# Patient Record
Sex: MALE | Race: WHITE | NOT HISPANIC OR LATINO | URBAN - METROPOLITAN AREA
[De-identification: names, ages, dates, MRNs, and addresses within clinical notes are randomized per-mention and may not be internally consistent; named-entity substitution may affect disease eponyms.]

---

## 2017-06-29 ENCOUNTER — OUTPATIENT (OUTPATIENT)
Dept: OUTPATIENT SERVICES | Facility: HOSPITAL | Age: 22
LOS: 1 days | Discharge: HOME | End: 2017-06-29

## 2017-06-29 DIAGNOSIS — F20.9 SCHIZOPHRENIA, UNSPECIFIED: ICD-10-CM

## 2017-06-29 DIAGNOSIS — F20.0 PARANOID SCHIZOPHRENIA: ICD-10-CM

## 2017-07-27 ENCOUNTER — OUTPATIENT (OUTPATIENT)
Dept: OUTPATIENT SERVICES | Facility: HOSPITAL | Age: 22
LOS: 1 days | Discharge: HOME | End: 2017-07-27

## 2017-07-27 DIAGNOSIS — F20.9 SCHIZOPHRENIA, UNSPECIFIED: ICD-10-CM

## 2017-07-27 DIAGNOSIS — F20.0 PARANOID SCHIZOPHRENIA: ICD-10-CM

## 2017-08-24 ENCOUNTER — OUTPATIENT (OUTPATIENT)
Dept: OUTPATIENT SERVICES | Facility: HOSPITAL | Age: 22
LOS: 1 days | Discharge: HOME | End: 2017-08-24

## 2017-08-24 DIAGNOSIS — F20.0 PARANOID SCHIZOPHRENIA: ICD-10-CM

## 2017-08-24 DIAGNOSIS — F20.9 SCHIZOPHRENIA, UNSPECIFIED: ICD-10-CM

## 2017-11-14 ENCOUNTER — OUTPATIENT (OUTPATIENT)
Dept: OUTPATIENT SERVICES | Facility: HOSPITAL | Age: 22
LOS: 1 days | Discharge: HOME | End: 2017-11-14

## 2017-11-14 ENCOUNTER — INPATIENT (INPATIENT)
Facility: HOSPITAL | Age: 22
LOS: 16 days | Discharge: HOME | End: 2017-12-01
Attending: PSYCHIATRY & NEUROLOGY

## 2017-11-14 DIAGNOSIS — F20.9 SCHIZOPHRENIA, UNSPECIFIED: ICD-10-CM

## 2017-11-14 DIAGNOSIS — F20.0 PARANOID SCHIZOPHRENIA: ICD-10-CM

## 2017-12-06 DIAGNOSIS — F20.0 PARANOID SCHIZOPHRENIA: ICD-10-CM

## 2017-12-06 DIAGNOSIS — F10.10 ALCOHOL ABUSE, UNCOMPLICATED: ICD-10-CM

## 2017-12-06 DIAGNOSIS — Z91.19 PATIENT'S NONCOMPLIANCE WITH OTHER MEDICAL TREATMENT AND REGIMEN: ICD-10-CM

## 2017-12-06 DIAGNOSIS — F19.10 OTHER PSYCHOACTIVE SUBSTANCE ABUSE, UNCOMPLICATED: ICD-10-CM

## 2017-12-29 ENCOUNTER — OUTPATIENT (OUTPATIENT)
Dept: OUTPATIENT SERVICES | Facility: HOSPITAL | Age: 22
LOS: 1 days | Discharge: HOME | End: 2017-12-29

## 2017-12-29 DIAGNOSIS — F20.9 SCHIZOPHRENIA, UNSPECIFIED: ICD-10-CM

## 2018-01-18 ENCOUNTER — OUTPATIENT (OUTPATIENT)
Dept: OUTPATIENT SERVICES | Facility: HOSPITAL | Age: 23
LOS: 1 days | Discharge: HOME | End: 2018-01-18

## 2018-01-18 DIAGNOSIS — F20.9 SCHIZOPHRENIA, UNSPECIFIED: ICD-10-CM

## 2018-01-30 DIAGNOSIS — F20.0 PARANOID SCHIZOPHRENIA: ICD-10-CM

## 2018-02-15 ENCOUNTER — OUTPATIENT (OUTPATIENT)
Dept: OUTPATIENT SERVICES | Facility: HOSPITAL | Age: 23
LOS: 1 days | Discharge: HOME | End: 2018-02-15

## 2018-02-15 DIAGNOSIS — F20.9 SCHIZOPHRENIA, UNSPECIFIED: ICD-10-CM

## 2018-03-14 ENCOUNTER — OUTPATIENT (OUTPATIENT)
Dept: OUTPATIENT SERVICES | Facility: HOSPITAL | Age: 23
LOS: 1 days | Discharge: HOME | End: 2018-03-14

## 2018-03-14 DIAGNOSIS — F20.9 SCHIZOPHRENIA, UNSPECIFIED: ICD-10-CM

## 2018-04-18 ENCOUNTER — OUTPATIENT (OUTPATIENT)
Dept: OUTPATIENT SERVICES | Facility: HOSPITAL | Age: 23
LOS: 1 days | Discharge: HOME | End: 2018-04-18

## 2018-04-18 DIAGNOSIS — F20.9 SCHIZOPHRENIA, UNSPECIFIED: ICD-10-CM

## 2018-05-14 ENCOUNTER — OUTPATIENT (OUTPATIENT)
Dept: OUTPATIENT SERVICES | Facility: HOSPITAL | Age: 23
LOS: 1 days | Discharge: HOME | End: 2018-05-14

## 2018-05-14 DIAGNOSIS — F20.9 SCHIZOPHRENIA, UNSPECIFIED: ICD-10-CM

## 2018-12-11 ENCOUNTER — OUTPATIENT (OUTPATIENT)
Dept: OUTPATIENT SERVICES | Facility: HOSPITAL | Age: 23
LOS: 1 days | Discharge: HOME | End: 2018-12-11

## 2018-12-11 DIAGNOSIS — F20.9 SCHIZOPHRENIA, UNSPECIFIED: ICD-10-CM

## 2018-12-26 ENCOUNTER — OUTPATIENT (OUTPATIENT)
Dept: OUTPATIENT SERVICES | Facility: HOSPITAL | Age: 23
LOS: 1 days | Discharge: HOME | End: 2018-12-26

## 2018-12-26 DIAGNOSIS — F20.9 SCHIZOPHRENIA, UNSPECIFIED: ICD-10-CM

## 2019-01-11 DIAGNOSIS — F20.0 PARANOID SCHIZOPHRENIA: ICD-10-CM

## 2019-01-23 ENCOUNTER — OUTPATIENT (OUTPATIENT)
Dept: OUTPATIENT SERVICES | Facility: HOSPITAL | Age: 24
LOS: 1 days | Discharge: HOME | End: 2019-01-23

## 2019-01-23 DIAGNOSIS — F20.9 SCHIZOPHRENIA, UNSPECIFIED: ICD-10-CM

## 2019-02-20 ENCOUNTER — OUTPATIENT (OUTPATIENT)
Dept: OUTPATIENT SERVICES | Facility: HOSPITAL | Age: 24
LOS: 1 days | Discharge: HOME | End: 2019-02-20

## 2019-02-20 DIAGNOSIS — F20.9 SCHIZOPHRENIA, UNSPECIFIED: ICD-10-CM

## 2019-03-20 ENCOUNTER — OUTPATIENT (OUTPATIENT)
Dept: OUTPATIENT SERVICES | Facility: HOSPITAL | Age: 24
LOS: 1 days | Discharge: HOME | End: 2019-03-20

## 2019-03-20 DIAGNOSIS — F20.9 SCHIZOPHRENIA, UNSPECIFIED: ICD-10-CM

## 2019-04-17 ENCOUNTER — OUTPATIENT (OUTPATIENT)
Dept: OUTPATIENT SERVICES | Facility: HOSPITAL | Age: 24
LOS: 1 days | Discharge: HOME | End: 2019-04-17

## 2019-04-17 DIAGNOSIS — F20.9 SCHIZOPHRENIA, UNSPECIFIED: ICD-10-CM

## 2019-05-15 ENCOUNTER — OUTPATIENT (OUTPATIENT)
Dept: OUTPATIENT SERVICES | Facility: HOSPITAL | Age: 24
LOS: 1 days | Discharge: HOME | End: 2019-05-15
Payer: MEDICARE

## 2019-05-15 DIAGNOSIS — F20.9 SCHIZOPHRENIA, UNSPECIFIED: ICD-10-CM

## 2019-05-15 PROCEDURE — 99213 OFFICE O/P EST LOW 20 MIN: CPT

## 2019-06-12 ENCOUNTER — OUTPATIENT (OUTPATIENT)
Dept: OUTPATIENT SERVICES | Facility: HOSPITAL | Age: 24
LOS: 1 days | Discharge: HOME | End: 2019-06-12
Payer: MEDICARE

## 2019-06-12 DIAGNOSIS — F20.9 SCHIZOPHRENIA, UNSPECIFIED: ICD-10-CM

## 2019-06-12 PROCEDURE — 99213 OFFICE O/P EST LOW 20 MIN: CPT

## 2019-07-10 ENCOUNTER — OUTPATIENT (OUTPATIENT)
Dept: OUTPATIENT SERVICES | Facility: HOSPITAL | Age: 24
LOS: 1 days | Discharge: HOME | End: 2019-07-10
Payer: MEDICARE

## 2019-07-10 DIAGNOSIS — F20.9 SCHIZOPHRENIA, UNSPECIFIED: ICD-10-CM

## 2019-07-10 PROCEDURE — 99213 OFFICE O/P EST LOW 20 MIN: CPT

## 2019-08-07 ENCOUNTER — OUTPATIENT (OUTPATIENT)
Dept: OUTPATIENT SERVICES | Facility: HOSPITAL | Age: 24
LOS: 1 days | Discharge: HOME | End: 2019-08-07
Payer: MEDICARE

## 2019-08-07 DIAGNOSIS — F20.9 SCHIZOPHRENIA, UNSPECIFIED: ICD-10-CM

## 2019-08-07 PROCEDURE — 99213 OFFICE O/P EST LOW 20 MIN: CPT

## 2019-09-04 ENCOUNTER — OUTPATIENT (OUTPATIENT)
Dept: OUTPATIENT SERVICES | Facility: HOSPITAL | Age: 24
LOS: 1 days | Discharge: HOME | End: 2019-09-04
Payer: MEDICARE

## 2019-09-04 DIAGNOSIS — F20.9 SCHIZOPHRENIA, UNSPECIFIED: ICD-10-CM

## 2019-09-04 PROCEDURE — 99213 OFFICE O/P EST LOW 20 MIN: CPT

## 2019-10-02 ENCOUNTER — OUTPATIENT (OUTPATIENT)
Dept: OUTPATIENT SERVICES | Facility: HOSPITAL | Age: 24
LOS: 1 days | Discharge: HOME | End: 2019-10-02
Payer: MEDICARE

## 2019-10-02 DIAGNOSIS — F20.9 SCHIZOPHRENIA, UNSPECIFIED: ICD-10-CM

## 2019-10-02 PROCEDURE — 99213 OFFICE O/P EST LOW 20 MIN: CPT

## 2019-10-30 ENCOUNTER — OUTPATIENT (OUTPATIENT)
Dept: OUTPATIENT SERVICES | Facility: HOSPITAL | Age: 24
LOS: 1 days | Discharge: HOME | End: 2019-10-30

## 2019-10-30 DIAGNOSIS — F20.9 SCHIZOPHRENIA, UNSPECIFIED: ICD-10-CM

## 2019-11-25 ENCOUNTER — OUTPATIENT (OUTPATIENT)
Dept: OUTPATIENT SERVICES | Facility: HOSPITAL | Age: 24
LOS: 1 days | Discharge: HOME | End: 2019-11-25
Payer: MEDICARE

## 2019-11-25 DIAGNOSIS — F20.9 SCHIZOPHRENIA, UNSPECIFIED: ICD-10-CM

## 2019-11-25 PROCEDURE — 99214 OFFICE O/P EST MOD 30 MIN: CPT

## 2019-12-23 ENCOUNTER — OUTPATIENT (OUTPATIENT)
Dept: OUTPATIENT SERVICES | Facility: HOSPITAL | Age: 24
LOS: 1 days | Discharge: HOME | End: 2019-12-23
Payer: MEDICARE

## 2019-12-23 DIAGNOSIS — F20.9 SCHIZOPHRENIA, UNSPECIFIED: ICD-10-CM

## 2019-12-23 PROCEDURE — 99213 OFFICE O/P EST LOW 20 MIN: CPT

## 2020-01-22 ENCOUNTER — OUTPATIENT (OUTPATIENT)
Dept: OUTPATIENT SERVICES | Facility: HOSPITAL | Age: 25
LOS: 1 days | Discharge: HOME | End: 2020-01-22
Payer: MEDICARE

## 2020-01-22 DIAGNOSIS — F20.9 SCHIZOPHRENIA, UNSPECIFIED: ICD-10-CM

## 2020-01-22 PROCEDURE — 99213 OFFICE O/P EST LOW 20 MIN: CPT

## 2020-02-19 ENCOUNTER — OUTPATIENT (OUTPATIENT)
Dept: OUTPATIENT SERVICES | Facility: HOSPITAL | Age: 25
LOS: 1 days | Discharge: HOME | End: 2020-02-19

## 2020-02-19 DIAGNOSIS — F20.9 SCHIZOPHRENIA, UNSPECIFIED: ICD-10-CM

## 2020-03-18 ENCOUNTER — OUTPATIENT (OUTPATIENT)
Dept: OUTPATIENT SERVICES | Facility: HOSPITAL | Age: 25
LOS: 1 days | Discharge: HOME | End: 2020-03-18
Payer: MEDICARE

## 2020-03-18 DIAGNOSIS — F20.9 SCHIZOPHRENIA, UNSPECIFIED: ICD-10-CM

## 2020-03-18 PROCEDURE — 99213 OFFICE O/P EST LOW 20 MIN: CPT

## 2020-04-15 ENCOUNTER — OUTPATIENT (OUTPATIENT)
Dept: OUTPATIENT SERVICES | Facility: HOSPITAL | Age: 25
LOS: 1 days | Discharge: HOME | End: 2020-04-15

## 2020-04-15 DIAGNOSIS — F20.9 SCHIZOPHRENIA, UNSPECIFIED: ICD-10-CM

## 2020-05-13 ENCOUNTER — OUTPATIENT (OUTPATIENT)
Dept: OUTPATIENT SERVICES | Facility: HOSPITAL | Age: 25
LOS: 1 days | Discharge: HOME | End: 2020-05-13

## 2020-05-13 DIAGNOSIS — F20.9 SCHIZOPHRENIA, UNSPECIFIED: ICD-10-CM

## 2020-06-10 ENCOUNTER — OUTPATIENT (OUTPATIENT)
Dept: OUTPATIENT SERVICES | Facility: HOSPITAL | Age: 25
LOS: 1 days | Discharge: HOME | End: 2020-06-10
Payer: MEDICARE

## 2020-06-10 DIAGNOSIS — F20.9 SCHIZOPHRENIA, UNSPECIFIED: ICD-10-CM

## 2020-06-10 PROCEDURE — 99213 OFFICE O/P EST LOW 20 MIN: CPT

## 2020-07-08 ENCOUNTER — OUTPATIENT (OUTPATIENT)
Dept: OUTPATIENT SERVICES | Facility: HOSPITAL | Age: 25
LOS: 1 days | Discharge: HOME | End: 2020-07-08

## 2020-07-08 DIAGNOSIS — F20.9 SCHIZOPHRENIA, UNSPECIFIED: ICD-10-CM

## 2020-08-12 ENCOUNTER — OUTPATIENT (OUTPATIENT)
Dept: OUTPATIENT SERVICES | Facility: HOSPITAL | Age: 25
LOS: 1 days | Discharge: HOME | End: 2020-08-12

## 2020-08-12 DIAGNOSIS — F20.9 SCHIZOPHRENIA, UNSPECIFIED: ICD-10-CM

## 2020-09-02 ENCOUNTER — OUTPATIENT (OUTPATIENT)
Dept: OUTPATIENT SERVICES | Facility: HOSPITAL | Age: 25
LOS: 1 days | Discharge: HOME | End: 2020-09-02
Payer: MEDICARE

## 2020-09-02 DIAGNOSIS — F20.9 SCHIZOPHRENIA, UNSPECIFIED: ICD-10-CM

## 2020-09-02 PROCEDURE — 99213 OFFICE O/P EST LOW 20 MIN: CPT

## 2020-10-28 ENCOUNTER — APPOINTMENT (OUTPATIENT)
Dept: PSYCHIATRY | Facility: CLINIC | Age: 25
End: 2020-10-28

## 2020-11-11 ENCOUNTER — OUTPATIENT (OUTPATIENT)
Dept: OUTPATIENT SERVICES | Facility: HOSPITAL | Age: 25
LOS: 1 days | Discharge: HOME | End: 2020-11-11

## 2020-11-11 DIAGNOSIS — F20.9 SCHIZOPHRENIA, UNSPECIFIED: ICD-10-CM

## 2020-11-23 ENCOUNTER — APPOINTMENT (OUTPATIENT)
Dept: PSYCHIATRY | Facility: CLINIC | Age: 25
End: 2020-11-23
Payer: MEDICARE

## 2020-11-23 ENCOUNTER — APPOINTMENT (OUTPATIENT)
Dept: PSYCHIATRY | Facility: CLINIC | Age: 25
End: 2020-11-23

## 2020-11-23 ENCOUNTER — OUTPATIENT (OUTPATIENT)
Dept: OUTPATIENT SERVICES | Facility: HOSPITAL | Age: 25
LOS: 1 days | Discharge: HOME | End: 2020-11-23

## 2020-11-23 DIAGNOSIS — F20.9 SCHIZOPHRENIA, UNSPECIFIED: ICD-10-CM

## 2020-11-23 PROCEDURE — 99213 OFFICE O/P EST LOW 20 MIN: CPT

## 2020-12-18 ENCOUNTER — NON-APPOINTMENT (OUTPATIENT)
Age: 25
End: 2020-12-18

## 2020-12-18 DIAGNOSIS — F10.10 ALCOHOL ABUSE, UNCOMPLICATED: ICD-10-CM

## 2020-12-23 ENCOUNTER — APPOINTMENT (OUTPATIENT)
Dept: PSYCHIATRY | Facility: CLINIC | Age: 25
End: 2020-12-23

## 2020-12-31 ENCOUNTER — OUTPATIENT (OUTPATIENT)
Dept: OUTPATIENT SERVICES | Facility: HOSPITAL | Age: 25
LOS: 1 days | Discharge: HOME | End: 2020-12-31

## 2020-12-31 ENCOUNTER — APPOINTMENT (OUTPATIENT)
Dept: PSYCHIATRY | Facility: CLINIC | Age: 25
End: 2020-12-31

## 2020-12-31 DIAGNOSIS — F20.9 SCHIZOPHRENIA, UNSPECIFIED: ICD-10-CM

## 2021-01-28 ENCOUNTER — APPOINTMENT (OUTPATIENT)
Dept: PSYCHIATRY | Facility: CLINIC | Age: 26
End: 2021-01-28

## 2021-02-11 ENCOUNTER — OUTPATIENT (OUTPATIENT)
Dept: OUTPATIENT SERVICES | Facility: HOSPITAL | Age: 26
LOS: 1 days | Discharge: HOME | End: 2021-02-11

## 2021-02-11 ENCOUNTER — APPOINTMENT (OUTPATIENT)
Dept: PSYCHIATRY | Facility: CLINIC | Age: 26
End: 2021-02-11

## 2021-02-11 DIAGNOSIS — F20.9 SCHIZOPHRENIA, UNSPECIFIED: ICD-10-CM

## 2021-02-16 ENCOUNTER — NON-APPOINTMENT (OUTPATIENT)
Age: 26
End: 2021-02-16

## 2021-02-17 ENCOUNTER — APPOINTMENT (OUTPATIENT)
Dept: PSYCHIATRY | Facility: CLINIC | Age: 26
End: 2021-02-17

## 2021-02-17 ENCOUNTER — APPOINTMENT (OUTPATIENT)
Dept: PSYCHIATRY | Facility: CLINIC | Age: 26
End: 2021-02-17
Payer: MEDICARE

## 2021-02-17 ENCOUNTER — OUTPATIENT (OUTPATIENT)
Dept: OUTPATIENT SERVICES | Facility: HOSPITAL | Age: 26
LOS: 1 days | Discharge: HOME | End: 2021-02-17

## 2021-02-17 VITALS
DIASTOLIC BLOOD PRESSURE: 71 MMHG | SYSTOLIC BLOOD PRESSURE: 113 MMHG | HEART RATE: 78 BPM | BODY MASS INDEX: 32.33 KG/M2 | HEIGHT: 67 IN | WEIGHT: 206 LBS | RESPIRATION RATE: 16 BRPM

## 2021-02-17 DIAGNOSIS — F20.9 SCHIZOPHRENIA, UNSPECIFIED: ICD-10-CM

## 2021-02-17 PROCEDURE — 99213 OFFICE O/P EST LOW 20 MIN: CPT | Mod: 95

## 2021-02-17 RX ORDER — PALIPERIDONE PALMITATE 273 MG/.875ML
273 INJECTION, SUSPENSION, EXTENDED RELEASE INTRAMUSCULAR
Qty: 0 | Refills: 0 | Status: COMPLETED | OUTPATIENT
Start: 2021-02-16

## 2021-03-11 ENCOUNTER — OUTPATIENT (OUTPATIENT)
Dept: OUTPATIENT SERVICES | Facility: HOSPITAL | Age: 26
LOS: 1 days | Discharge: HOME | End: 2021-03-11

## 2021-03-11 ENCOUNTER — APPOINTMENT (OUTPATIENT)
Dept: PSYCHIATRY | Facility: CLINIC | Age: 26
End: 2021-03-11

## 2021-03-11 DIAGNOSIS — F20.9 SCHIZOPHRENIA, UNSPECIFIED: ICD-10-CM

## 2021-04-07 ENCOUNTER — APPOINTMENT (OUTPATIENT)
Dept: PSYCHIATRY | Facility: CLINIC | Age: 26
End: 2021-04-07

## 2021-04-07 ENCOUNTER — OUTPATIENT (OUTPATIENT)
Dept: OUTPATIENT SERVICES | Facility: HOSPITAL | Age: 26
LOS: 1 days | Discharge: HOME | End: 2021-04-07

## 2021-05-05 ENCOUNTER — APPOINTMENT (OUTPATIENT)
Dept: PSYCHIATRY | Facility: CLINIC | Age: 26
End: 2021-05-05

## 2021-05-10 ENCOUNTER — APPOINTMENT (OUTPATIENT)
Dept: PSYCHIATRY | Facility: CLINIC | Age: 26
End: 2021-05-10
Payer: MEDICARE

## 2021-05-10 ENCOUNTER — OUTPATIENT (OUTPATIENT)
Dept: OUTPATIENT SERVICES | Facility: HOSPITAL | Age: 26
LOS: 1 days | Discharge: HOME | End: 2021-05-10

## 2021-05-10 DIAGNOSIS — F20.9 SCHIZOPHRENIA, UNSPECIFIED: ICD-10-CM

## 2021-05-10 PROCEDURE — 99213 OFFICE O/P EST LOW 20 MIN: CPT | Mod: 95

## 2021-05-12 ENCOUNTER — APPOINTMENT (OUTPATIENT)
Dept: PSYCHIATRY | Facility: CLINIC | Age: 26
End: 2021-05-12

## 2021-05-18 RX ADMIN — PALIPERIDONE PALMITATE 0 MG/0.875ML: 273 INJECTION, SUSPENSION, EXTENDED RELEASE INTRAMUSCULAR at 00:00

## 2021-05-19 ENCOUNTER — APPOINTMENT (OUTPATIENT)
Dept: PSYCHIATRY | Facility: CLINIC | Age: 26
End: 2021-05-19

## 2021-05-19 ENCOUNTER — OUTPATIENT (OUTPATIENT)
Dept: OUTPATIENT SERVICES | Facility: HOSPITAL | Age: 26
LOS: 1 days | Discharge: HOME | End: 2021-05-19

## 2021-05-19 VITALS — HEART RATE: 84 BPM | WEIGHT: 206 LBS | BODY MASS INDEX: 32.33 KG/M2 | HEIGHT: 67 IN | RESPIRATION RATE: 16 BRPM

## 2021-05-19 DIAGNOSIS — F20.9 SCHIZOPHRENIA, UNSPECIFIED: ICD-10-CM

## 2021-05-19 RX ORDER — PALIPERIDONE PALMITATE 273 MG/.875ML
273 INJECTION, SUSPENSION, EXTENDED RELEASE INTRAMUSCULAR
Qty: 0 | Refills: 0 | Status: COMPLETED | OUTPATIENT
Start: 2021-05-18

## 2021-07-27 ENCOUNTER — NON-APPOINTMENT (OUTPATIENT)
Age: 26
End: 2021-07-27

## 2021-08-02 ENCOUNTER — NON-APPOINTMENT (OUTPATIENT)
Age: 26
End: 2021-08-02

## 2021-08-04 ENCOUNTER — APPOINTMENT (OUTPATIENT)
Dept: PSYCHIATRY | Facility: CLINIC | Age: 26
End: 2021-08-04

## 2021-08-10 ENCOUNTER — NON-APPOINTMENT (OUTPATIENT)
Age: 26
End: 2021-08-10

## 2021-08-11 ENCOUNTER — OUTPATIENT (OUTPATIENT)
Dept: OUTPATIENT SERVICES | Facility: HOSPITAL | Age: 26
LOS: 1 days | Discharge: HOME | End: 2021-08-11

## 2021-08-11 ENCOUNTER — APPOINTMENT (OUTPATIENT)
Dept: PSYCHIATRY | Facility: CLINIC | Age: 26
End: 2021-08-11
Payer: MEDICARE

## 2021-08-11 VITALS
HEIGHT: 67 IN | RESPIRATION RATE: 16 BRPM | BODY MASS INDEX: 32.49 KG/M2 | SYSTOLIC BLOOD PRESSURE: 108 MMHG | DIASTOLIC BLOOD PRESSURE: 70 MMHG | WEIGHT: 207 LBS | HEART RATE: 64 BPM

## 2021-08-11 DIAGNOSIS — F20.9 SCHIZOPHRENIA, UNSPECIFIED: ICD-10-CM

## 2021-08-11 PROCEDURE — 99213 OFFICE O/P EST LOW 20 MIN: CPT | Mod: 95

## 2021-08-11 RX ORDER — PALIPERIDONE PALMITATE 273 MG/.875ML
273 INJECTION, SUSPENSION, EXTENDED RELEASE INTRAMUSCULAR
Qty: 0 | Refills: 0 | Status: COMPLETED | OUTPATIENT
Start: 2021-08-11

## 2021-08-11 RX ADMIN — PALIPERIDONE PALMITATE 0 MG/0.875ML: 273 INJECTION, SUSPENSION, EXTENDED RELEASE INTRAMUSCULAR at 00:00

## 2021-08-23 ENCOUNTER — NON-APPOINTMENT (OUTPATIENT)
Age: 26
End: 2021-08-23

## 2021-08-31 ENCOUNTER — NON-APPOINTMENT (OUTPATIENT)
Age: 26
End: 2021-08-31

## 2021-09-10 ENCOUNTER — NON-APPOINTMENT (OUTPATIENT)
Age: 26
End: 2021-09-10

## 2021-09-23 ENCOUNTER — NON-APPOINTMENT (OUTPATIENT)
Age: 26
End: 2021-09-23

## 2021-10-06 ENCOUNTER — NON-APPOINTMENT (OUTPATIENT)
Age: 26
End: 2021-10-06

## 2021-10-28 ENCOUNTER — NON-APPOINTMENT (OUTPATIENT)
Age: 26
End: 2021-10-28

## 2021-11-02 ENCOUNTER — NON-APPOINTMENT (OUTPATIENT)
Age: 26
End: 2021-11-02

## 2021-11-03 ENCOUNTER — APPOINTMENT (OUTPATIENT)
Dept: PSYCHIATRY | Facility: CLINIC | Age: 26
End: 2021-11-03
Payer: MEDICARE

## 2021-11-03 ENCOUNTER — OUTPATIENT (OUTPATIENT)
Dept: OUTPATIENT SERVICES | Facility: HOSPITAL | Age: 26
LOS: 1 days | Discharge: HOME | End: 2021-11-03

## 2021-11-03 VITALS
BODY MASS INDEX: 33.59 KG/M2 | RESPIRATION RATE: 16 BRPM | WEIGHT: 214 LBS | HEART RATE: 80 BPM | SYSTOLIC BLOOD PRESSURE: 118 MMHG | DIASTOLIC BLOOD PRESSURE: 86 MMHG | HEIGHT: 67 IN

## 2021-11-03 DIAGNOSIS — F20.9 SCHIZOPHRENIA, UNSPECIFIED: ICD-10-CM

## 2021-11-03 PROCEDURE — 99213 OFFICE O/P EST LOW 20 MIN: CPT

## 2021-11-03 RX ORDER — PALIPERIDONE PALMITATE 273 MG/.875ML
273 INJECTION, SUSPENSION, EXTENDED RELEASE INTRAMUSCULAR
Qty: 0 | Refills: 0 | Status: COMPLETED | OUTPATIENT
Start: 2021-11-03

## 2021-11-03 RX ADMIN — PALIPERIDONE PALMITATE 0 MG/0.875ML: 273 INJECTION, SUSPENSION, EXTENDED RELEASE INTRAMUSCULAR at 00:00

## 2021-12-03 ENCOUNTER — OUTPATIENT (OUTPATIENT)
Dept: OUTPATIENT SERVICES | Facility: HOSPITAL | Age: 26
LOS: 1 days | Discharge: HOME | End: 2021-12-03

## 2021-12-03 ENCOUNTER — APPOINTMENT (OUTPATIENT)
Dept: PSYCHIATRY | Facility: CLINIC | Age: 26
End: 2021-12-03

## 2021-12-03 DIAGNOSIS — F20.9 SCHIZOPHRENIA, UNSPECIFIED: ICD-10-CM

## 2022-01-03 ENCOUNTER — OUTPATIENT (OUTPATIENT)
Dept: OUTPATIENT SERVICES | Facility: HOSPITAL | Age: 27
LOS: 1 days | Discharge: HOME | End: 2022-01-03

## 2022-01-03 ENCOUNTER — APPOINTMENT (OUTPATIENT)
Age: 27
End: 2022-01-03

## 2022-01-03 DIAGNOSIS — F20.9 SCHIZOPHRENIA, UNSPECIFIED: ICD-10-CM

## 2022-01-25 ENCOUNTER — NON-APPOINTMENT (OUTPATIENT)
Age: 27
End: 2022-01-25

## 2022-01-26 ENCOUNTER — APPOINTMENT (OUTPATIENT)
Dept: PSYCHIATRY | Facility: CLINIC | Age: 27
End: 2022-01-26
Payer: MEDICARE

## 2022-01-26 ENCOUNTER — OUTPATIENT (OUTPATIENT)
Dept: OUTPATIENT SERVICES | Facility: HOSPITAL | Age: 27
LOS: 1 days | Discharge: HOME | End: 2022-01-26

## 2022-01-26 VITALS
HEART RATE: 80 BPM | BODY MASS INDEX: 34.21 KG/M2 | HEIGHT: 67 IN | WEIGHT: 218 LBS | RESPIRATION RATE: 16 BRPM | DIASTOLIC BLOOD PRESSURE: 70 MMHG | SYSTOLIC BLOOD PRESSURE: 116 MMHG

## 2022-01-26 DIAGNOSIS — F20.9 SCHIZOPHRENIA, UNSPECIFIED: ICD-10-CM

## 2022-01-26 PROCEDURE — 99213 OFFICE O/P EST LOW 20 MIN: CPT | Mod: 95

## 2022-01-26 RX ORDER — PALIPERIDONE PALMITATE 273 MG/.875ML
273 INJECTION, SUSPENSION, EXTENDED RELEASE INTRAMUSCULAR
Qty: 0 | Refills: 0 | Status: COMPLETED | OUTPATIENT
Start: 2022-01-26

## 2022-01-26 RX ADMIN — PALIPERIDONE PALMITATE 0 MG/0.88ML: 273 INJECTION, SUSPENSION, EXTENDED RELEASE INTRAMUSCULAR at 00:00

## 2022-03-10 ENCOUNTER — APPOINTMENT (OUTPATIENT)
Age: 27
End: 2022-03-10

## 2022-03-10 ENCOUNTER — OUTPATIENT (OUTPATIENT)
Dept: OUTPATIENT SERVICES | Facility: HOSPITAL | Age: 27
LOS: 1 days | Discharge: HOME | End: 2022-03-10

## 2022-03-10 DIAGNOSIS — F20.9 SCHIZOPHRENIA, UNSPECIFIED: ICD-10-CM

## 2022-04-11 ENCOUNTER — APPOINTMENT (OUTPATIENT)
Age: 27
End: 2022-04-11

## 2022-04-11 ENCOUNTER — OUTPATIENT (OUTPATIENT)
Dept: OUTPATIENT SERVICES | Facility: HOSPITAL | Age: 27
LOS: 1 days | Discharge: HOME | End: 2022-04-11

## 2022-04-12 DIAGNOSIS — F20.9 SCHIZOPHRENIA, UNSPECIFIED: ICD-10-CM

## 2022-04-20 ENCOUNTER — APPOINTMENT (OUTPATIENT)
Dept: PSYCHIATRY | Facility: CLINIC | Age: 27
End: 2022-04-20
Payer: MEDICARE

## 2022-04-20 ENCOUNTER — APPOINTMENT (OUTPATIENT)
Dept: PSYCHIATRY | Facility: CLINIC | Age: 27
End: 2022-04-20

## 2022-04-20 ENCOUNTER — OUTPATIENT (OUTPATIENT)
Dept: OUTPATIENT SERVICES | Facility: HOSPITAL | Age: 27
LOS: 1 days | Discharge: HOME | End: 2022-04-20

## 2022-04-20 VITALS
HEART RATE: 64 BPM | RESPIRATION RATE: 18 BRPM | HEIGHT: 67 IN | BODY MASS INDEX: 32.18 KG/M2 | WEIGHT: 205 LBS | DIASTOLIC BLOOD PRESSURE: 80 MMHG | SYSTOLIC BLOOD PRESSURE: 122 MMHG

## 2022-04-20 DIAGNOSIS — F20.9 SCHIZOPHRENIA, UNSPECIFIED: ICD-10-CM

## 2022-04-20 PROCEDURE — 99213 OFFICE O/P EST LOW 20 MIN: CPT

## 2022-04-20 RX ORDER — PALIPERIDONE PALMITATE 273 MG/.875ML
273 INJECTION, SUSPENSION, EXTENDED RELEASE INTRAMUSCULAR
Qty: 0 | Refills: 0 | Status: COMPLETED | OUTPATIENT
Start: 2022-04-20

## 2022-04-20 RX ADMIN — PALIPERIDONE PALMITATE 0 MG/0.88ML: 273 INJECTION, SUSPENSION, EXTENDED RELEASE INTRAMUSCULAR at 00:00

## 2022-05-11 ENCOUNTER — APPOINTMENT (OUTPATIENT)
Age: 27
End: 2022-05-11

## 2022-05-11 ENCOUNTER — OUTPATIENT (OUTPATIENT)
Dept: OUTPATIENT SERVICES | Facility: HOSPITAL | Age: 27
LOS: 1 days | Discharge: HOME | End: 2022-05-11

## 2022-05-11 DIAGNOSIS — F20.0 PARANOID SCHIZOPHRENIA: ICD-10-CM

## 2022-06-21 ENCOUNTER — OUTPATIENT (OUTPATIENT)
Dept: OUTPATIENT SERVICES | Facility: HOSPITAL | Age: 27
LOS: 1 days | Discharge: HOME | End: 2022-06-21

## 2022-06-21 ENCOUNTER — APPOINTMENT (OUTPATIENT)
Age: 27
End: 2022-06-21

## 2022-06-21 DIAGNOSIS — F20.0 PARANOID SCHIZOPHRENIA: ICD-10-CM

## 2022-07-12 ENCOUNTER — NON-APPOINTMENT (OUTPATIENT)
Age: 27
End: 2022-07-12

## 2022-07-13 ENCOUNTER — APPOINTMENT (OUTPATIENT)
Dept: PSYCHIATRY | Facility: CLINIC | Age: 27
End: 2022-07-13

## 2022-07-13 ENCOUNTER — OUTPATIENT (OUTPATIENT)
Dept: OUTPATIENT SERVICES | Facility: HOSPITAL | Age: 27
LOS: 1 days | Discharge: HOME | End: 2022-07-13

## 2022-07-13 VITALS
HEART RATE: 80 BPM | SYSTOLIC BLOOD PRESSURE: 110 MMHG | DIASTOLIC BLOOD PRESSURE: 70 MMHG | HEIGHT: 67 IN | WEIGHT: 198 LBS | BODY MASS INDEX: 31.08 KG/M2 | RESPIRATION RATE: 18 BRPM

## 2022-07-13 DIAGNOSIS — F20.0 PARANOID SCHIZOPHRENIA: ICD-10-CM

## 2022-07-13 PROCEDURE — 99214 OFFICE O/P EST MOD 30 MIN: CPT

## 2022-07-13 RX ORDER — PALIPERIDONE PALMITATE 273 MG/.875ML
273 INJECTION, SUSPENSION, EXTENDED RELEASE INTRAMUSCULAR
Qty: 0 | Refills: 0 | Status: COMPLETED | OUTPATIENT
Start: 2022-07-13

## 2022-07-13 RX ADMIN — PALIPERIDONE PALMITATE 0 MG/0.88ML: 273 INJECTION, SUSPENSION, EXTENDED RELEASE INTRAMUSCULAR at 00:00

## 2022-07-21 ENCOUNTER — APPOINTMENT (OUTPATIENT)
Age: 27
End: 2022-07-21

## 2022-07-21 ENCOUNTER — OUTPATIENT (OUTPATIENT)
Dept: OUTPATIENT SERVICES | Facility: HOSPITAL | Age: 27
LOS: 1 days | Discharge: HOME | End: 2022-07-21

## 2022-07-21 DIAGNOSIS — F20.9 SCHIZOPHRENIA, UNSPECIFIED: ICD-10-CM

## 2022-08-19 ENCOUNTER — NON-APPOINTMENT (OUTPATIENT)
Age: 27
End: 2022-08-19

## 2022-08-19 ENCOUNTER — LABORATORY RESULT (OUTPATIENT)
Age: 27
End: 2022-08-19

## 2022-08-19 ENCOUNTER — OUTPATIENT (OUTPATIENT)
Dept: OUTPATIENT SERVICES | Facility: HOSPITAL | Age: 27
LOS: 1 days | Discharge: HOME | End: 2022-08-19

## 2022-08-19 ENCOUNTER — APPOINTMENT (OUTPATIENT)
Dept: INTERNAL MEDICINE | Facility: CLINIC | Age: 27
End: 2022-08-19

## 2022-08-19 VITALS
OXYGEN SATURATION: 98 % | HEIGHT: 67 IN | DIASTOLIC BLOOD PRESSURE: 78 MMHG | HEART RATE: 79 BPM | RESPIRATION RATE: 18 BRPM | WEIGHT: 195 LBS | SYSTOLIC BLOOD PRESSURE: 121 MMHG | BODY MASS INDEX: 30.61 KG/M2 | TEMPERATURE: 98.5 F

## 2022-08-19 DIAGNOSIS — E66.9 OBESITY, UNSPECIFIED: ICD-10-CM

## 2022-08-19 DIAGNOSIS — Z00.00 ENCOUNTER FOR GENERAL ADULT MEDICAL EXAMINATION W/OUT ABNORMAL FINDINGS: ICD-10-CM

## 2022-08-19 PROCEDURE — 99213 OFFICE O/P EST LOW 20 MIN: CPT | Mod: GC

## 2022-08-19 PROCEDURE — 93010 ELECTROCARDIOGRAM REPORT: CPT

## 2022-08-19 NOTE — PHYSICAL EXAM
[No Acute Distress] : no acute distress [Well Nourished] : well nourished [Well Developed] : well developed [Well-Appearing] : well-appearing [Normal Sclera/Conjunctiva] : normal sclera/conjunctiva [PERRL] : pupils equal round and reactive to light [EOMI] : extraocular movements intact [Normal Outer Ear/Nose] : the outer ears and nose were normal in appearance [Normal Oropharynx] : the oropharynx was normal [No JVD] : no jugular venous distention [No Lymphadenopathy] : no lymphadenopathy [Supple] : supple [Thyroid Normal, No Nodules] : the thyroid was normal and there were no nodules present [No Respiratory Distress] : no respiratory distress  [No Accessory Muscle Use] : no accessory muscle use [Clear to Auscultation] : lungs were clear to auscultation bilaterally [Normal Rate] : normal rate  [Regular Rhythm] : with a regular rhythm [Normal S1, S2] : normal S1 and S2 [No Murmur] : no murmur heard [No Edema] : there was no peripheral edema [Soft] : abdomen soft [Non Tender] : non-tender [Non-distended] : non-distended [No Masses] : no abdominal mass palpated [No HSM] : no HSM [Normal Bowel Sounds] : normal bowel sounds [Normal Posterior Cervical Nodes] : no posterior cervical lymphadenopathy [Normal Anterior Cervical Nodes] : no anterior cervical lymphadenopathy [No CVA Tenderness] : no CVA  tenderness [No Spinal Tenderness] : no spinal tenderness [No Joint Swelling] : no joint swelling [Grossly Normal Strength/Tone] : grossly normal strength/tone [No Rash] : no rash [Coordination Grossly Intact] : coordination grossly intact [No Focal Deficits] : no focal deficits [Normal Gait] : normal gait [Normal Affect] : the affect was normal [Normal Insight/Judgement] : insight and judgment were intact

## 2022-08-22 ENCOUNTER — APPOINTMENT (OUTPATIENT)
Age: 27
End: 2022-08-22

## 2022-08-22 ENCOUNTER — OUTPATIENT (OUTPATIENT)
Dept: OUTPATIENT SERVICES | Facility: HOSPITAL | Age: 27
LOS: 1 days | Discharge: HOME | End: 2022-08-22

## 2022-08-22 DIAGNOSIS — F20.9 SCHIZOPHRENIA, UNSPECIFIED: ICD-10-CM

## 2022-08-24 DIAGNOSIS — Z00.00 ENCOUNTER FOR GENERAL ADULT MEDICAL EXAMINATION WITHOUT ABNORMAL FINDINGS: ICD-10-CM

## 2022-08-24 DIAGNOSIS — F20.0 PARANOID SCHIZOPHRENIA: ICD-10-CM

## 2022-08-26 LAB
ALBUMIN SERPL ELPH-MCNC: 4.8 G/DL
ALP BLD-CCNC: 88 U/L
ALT SERPL-CCNC: 19 U/L
ANION GAP SERPL CALC-SCNC: 15 MMOL/L
AST SERPL-CCNC: 12 U/L
BASOPHILS # BLD AUTO: 0.06 K/UL
BASOPHILS NFR BLD AUTO: 0.9 %
BILIRUB SERPL-MCNC: 0.4 MG/DL
BUN SERPL-MCNC: 10 MG/DL
CALCIUM SERPL-MCNC: 10 MG/DL
CHLORIDE SERPL-SCNC: 104 MMOL/L
CHOLEST SERPL-MCNC: 272 MG/DL
CO2 SERPL-SCNC: 20 MMOL/L
CREAT SERPL-MCNC: 1 MG/DL
EGFR: 105 ML/MIN/1.73M2
EOSINOPHIL # BLD AUTO: 0.41 K/UL
EOSINOPHIL NFR BLD AUTO: 6.1 %
ESTIMATED AVERAGE GLUCOSE: 105 MG/DL
GLUCOSE SERPL-MCNC: 95 MG/DL
HBA1C MFR BLD HPLC: 5.3 %
HCT VFR BLD CALC: 42.2 %
HCV AB SER QL: NONREACTIVE
HCV S/CO RATIO: 0.09 S/CO
HDLC SERPL-MCNC: 54 MG/DL
HGB BLD-MCNC: 13.2 G/DL
HIV1+2 AB SPEC QL IA.RAPID: NONREACTIVE
IMM GRANULOCYTES NFR BLD AUTO: 0.3 %
LDLC SERPL CALC-MCNC: 194 MG/DL
LYMPHOCYTES # BLD AUTO: 1.99 K/UL
LYMPHOCYTES NFR BLD AUTO: 29.6 %
M TB IFN-G BLD-IMP: NEGATIVE
MAN DIFF?: NORMAL
MCHC RBC-ENTMCNC: 28 PG
MCHC RBC-ENTMCNC: 31.3 G/DL
MCV RBC AUTO: 89.4 FL
MONOCYTES # BLD AUTO: 0.4 K/UL
MONOCYTES NFR BLD AUTO: 5.9 %
NEUTROPHILS # BLD AUTO: 3.85 K/UL
NEUTROPHILS NFR BLD AUTO: 57.2 %
NONHDLC SERPL-MCNC: 218 MG/DL
PLATELET # BLD AUTO: 258 K/UL
POTASSIUM SERPL-SCNC: 4.2 MMOL/L
PROT SERPL-MCNC: 8 G/DL
QUANTIFERON TB PLUS MITOGEN MINUS NIL: 1.06 IU/ML
QUANTIFERON TB PLUS NIL: 0.02 IU/ML
QUANTIFERON TB PLUS TB1 MINUS NIL: -0.01 IU/ML
QUANTIFERON TB PLUS TB2 MINUS NIL: -0.01 IU/ML
RBC # BLD: 4.72 M/UL
RBC # FLD: 14.4 %
SODIUM SERPL-SCNC: 139 MMOL/L
TRIGL SERPL-MCNC: 118 MG/DL
TSH SERPL-ACNC: 6.21 UIU/ML
WBC # FLD AUTO: 6.73 K/UL

## 2022-09-06 PROBLEM — E66.9 OBESITY: Status: ACTIVE | Noted: 2022-09-06

## 2022-09-06 NOTE — ASSESSMENT
[FreeTextEntry1] : 26M w/ h/o schizophrenia presenting to establish care. \par \par #Schizophrenia\par - currently on Invega FOSTER every 3 months\par - Follows w/ Dr. Lisa (scheduled to f/u on 8/22)\par - will obtain EKG (requested as part of routine physical)\par \par \par # increase BMI\par - counselled on lifestyle changes\par \par #Healthcare Management\par - HCV: none recorded; will send screen w/ routine labs\par - HIV: none recorded; will send screen w/ routine labs\par - Tetanus: \par - Influenza: out of season\par - Labwork: CBC, CMP, A1c, TSH, Lipid Panel\par - RTC in 6 months or PRN \par

## 2022-09-06 NOTE — HISTORY OF PRESENT ILLNESS
[FreeTextEntry1] : establish care [de-identified] : 26M w/ h/o schizophrenia, Hx of alcohol abuse presenting to establish care. Schizophrenia currently well controlled via FOSTER. Overall feeling well today. No acute complaints reported.

## 2022-09-06 NOTE — HISTORY OF PRESENT ILLNESS
[FreeTextEntry1] : establish care [de-identified] : 26M w/ h/o schizophrenia, Hx of alcohol abuse presenting to establish care. Schizophrenia currently well controlled via FOSTER. Overall feeling well today. No acute complaints reported.

## 2022-09-19 ENCOUNTER — APPOINTMENT (OUTPATIENT)
Dept: PSYCHIATRY | Facility: CLINIC | Age: 27
End: 2022-09-19

## 2022-09-19 ENCOUNTER — OUTPATIENT (OUTPATIENT)
Dept: OUTPATIENT SERVICES | Facility: HOSPITAL | Age: 27
LOS: 1 days | Discharge: HOME | End: 2022-09-19

## 2022-09-19 DIAGNOSIS — F20.0 PARANOID SCHIZOPHRENIA: ICD-10-CM

## 2022-09-19 NOTE — REASON FOR VISIT
[Home] : at home, [unfilled] , at the time of the visit. [Other Location: e.g. Home (Enter Location, City,State)___] : at [unfilled] [Self] : self [Patient] : Patient [FreeTextEntry1] : "Remain stable"

## 2022-09-19 NOTE — END OF VISIT
[Duration of Psychotherapy Visit (minutes spent in synchronous communication): ____] : Duration of Psychotherapy Visit (minutes spent in synchronous communication): [unfilled] [Individual Psychotherapy for 16-37 minutes] : Individual Psychotherapy for 16-37 minutes

## 2022-09-19 NOTE — PLAN
[FreeTextEntry2] : Goal #1- Medication management\par Obj #1- The patient will attend regularly scheduled IM injections with Nurse Mckinley and will meet regularly with Dr. Lisa \par \par Goal #2- Maintain overall stability \par Obj #1- The patient will attend Sabianism services regularly\par Obj #2- The patient will socialize with friends and family \par Obj #3- The patient will maintain daily structure by adhering to his budget that he set forth and will prepare healthy meals. \par  [Cognitive and/or Behavior Therapy] : Cognitive and/or Behavior Therapy  [Psychoeducation] : Psychoeducation  [Skills training (all types)] : Skills training (all types)  [Supportive Therapy] : Supportive Therapy [de-identified] : The patient attended his telehealth session with the writer via [employer]. The patient explained that he has been doing well without any fluctuations in his mood and or anxiety. The patient explained that he has been busy with Jehovah's witness and he recently returned from a men's retreat with his Jehovah's witness in which he enjoyed. The patient reported that he was able to spend time with members from his Jehovah's witness and interact with friends. The patient reported that he continues to eat two meals a day as he has been trying to be more health conscience. The patient reports that he continues to try and keep himself busy and he plans on going to [facility] to obtain SNAP benefits as his father is visiting from [place]. The writer commended the patient for socializing. The writer will continue to provide the patient with support and encouragement where needed. The patient is scheduled for a follow up visit on 10/24/2022 at 3pm after the writer returns from vacation.

## 2022-10-11 ENCOUNTER — NON-APPOINTMENT (OUTPATIENT)
Age: 27
End: 2022-10-11

## 2022-10-12 ENCOUNTER — APPOINTMENT (OUTPATIENT)
Dept: PSYCHIATRY | Facility: CLINIC | Age: 27
End: 2022-10-12

## 2022-10-12 ENCOUNTER — OUTPATIENT (OUTPATIENT)
Dept: OUTPATIENT SERVICES | Facility: HOSPITAL | Age: 27
LOS: 1 days | Discharge: HOME | End: 2022-10-12

## 2022-10-12 VITALS
SYSTOLIC BLOOD PRESSURE: 120 MMHG | HEART RATE: 84 BPM | WEIGHT: 189 LBS | DIASTOLIC BLOOD PRESSURE: 80 MMHG | RESPIRATION RATE: 18 BRPM | HEIGHT: 67 IN | BODY MASS INDEX: 29.66 KG/M2

## 2022-10-12 DIAGNOSIS — F20.0 PARANOID SCHIZOPHRENIA: ICD-10-CM

## 2022-10-12 PROCEDURE — 99213 OFFICE O/P EST LOW 20 MIN: CPT

## 2022-10-12 NOTE — PHYSICAL EXAM
[AH] : no auditory hallucinations [VH] : no visual hallucinations [Suicidal Ideation] : no suicidal ideation [Homicidal Ideation] : no homicidal ideation [Normal] : alert, oriented to person, place, and time [Fair] : fair [Adeq for basic needs] : adequate for basic needs [FreeTextEntry1] : phone visit  [FreeTextEntry9] : constricted

## 2022-10-12 NOTE — TREATMENT
[No Side Effects] : No side effects [FreeTextEntry4] : Channing was seen today for Invega Trinza 273mg IM in the right gluteal muscle. Area cleansed prior to and without signs or symptoms of adverse reaction after administration. Medication education reviewed with Channing he denies any side effects or concerns at this time. Channing reports his mood is "good". He reports he went for a physical also. Channing also had an appointment with Dr. Lisa please refer to that note for a mental status. Channing given emotional support and reassurance regarding a new Psychiatrist. He will return to this clinic on 1/3/23 to meet with this writer for injection. He also continues to meet with Jennifer monthly.

## 2022-10-12 NOTE — SOCIAL HISTORY
[With Family] : lives with family [Disabled] : disabled [Never ] : never  [High School] : high school [None] : none [Yes] : yes [TextBox_7] : currently sober

## 2022-10-12 NOTE — REASON FOR VISIT
[Patient] : Patient [Follow-Up Visit] : a follow-up visit [FreeTextEntry1] : follow up for Schizoaffective disorder  [Home] : at home, [unfilled] , at the time of the visit. [Medical Office: ([facility])___] : at the medical office located in  [Verbal consent obtained from patient] : the patient, [unfilled]

## 2022-10-12 NOTE — HISTORY OF PRESENT ILLNESS
[FreeTextEntry1] : PAtient was seen and evaluated  in person - in [facility]  [facility] prior getting his IM injection today. Channing was   cooperative and presented to be calm , still carelessly groomed. , He lost some weight recently but since the last visit 3 months ago was down 6 lbs . Patient brought the recent physical exam ECG and complete work, which were unremarkable. , Patient said that he has no  stressors right now and he continues to stay home  , going to the [employer]od[employer] on Saturday or , if needed , going to the store ,   Besides , attending some Orthodoxy activities , patient doesn’t interact much with others , except his immediate family ( sister, father)    Channing reported no voices and did not endorse any delusional ideas , he has chronic  poverty of thoughts , and persistent negative symptoms of apathy and affective blunting.  though,  He  denied mood changes or depressive symptoms ,  No acute safety concerns were identified today. No s/h ideations.\mary  Channing has been talking to his therapist monthly   [No] : no

## 2022-10-12 NOTE — ASSESSMENT
[FreeTextEntry1] : PAtient is 26 years old, , English speaking, single, unemployed men, with prior history of Schizoaffective Disorder versus\par  Schizophrenia , history of remote IPPs treatments, who presents to be stable with good control of his  illness recently ,without any active \par  positive or mood symptoms , without sense of hopelessness . No s/h ideations . Mariela's  daily functioning remains low\par  but he is not motivated to make changes at this time. Negative symptoms of luck of motivation, affective blunting are\par  persistent. but Channing   reports to attend Lutheran services weekly. THe new concern is the weight loss, will send patient for PE and complete blood work ( he is  overdue ) RN scheduled patient ,    PAtient unfortunately is resisting to be seen monthly-claiming he has high co pays  for each visit but  HE continues to meet with his t\par therapist monthly.  No SE from Sergei Dill observed or elicited. No signs of decompensation today.\par  Patient brought phsyical exam form, ECG (  ) blood work within normal limits .  Next follow up in 12 weeks.

## 2022-10-24 ENCOUNTER — APPOINTMENT (OUTPATIENT)
Dept: PSYCHIATRY | Facility: CLINIC | Age: 27
End: 2022-10-24

## 2022-10-24 ENCOUNTER — OUTPATIENT (OUTPATIENT)
Dept: OUTPATIENT SERVICES | Facility: HOSPITAL | Age: 27
LOS: 1 days | Discharge: HOME | End: 2022-10-24

## 2022-10-24 DIAGNOSIS — F20.0 PARANOID SCHIZOPHRENIA: ICD-10-CM

## 2022-10-24 NOTE — REASON FOR VISIT
LMOM requesting a call back  Elodia Zhang [Home] : at home, [unfilled] , at the time of the visit. [Other Location: e.g. Home (Enter Location, City,State)___] : at [unfilled] [Self] : self [Patient] : Patient [FreeTextEntry1] : psychotherapy follow up

## 2022-10-24 NOTE — PLAN
[FreeTextEntry2] : Goal #1- Medication management\par Obj #1- The patient will attend regularly scheduled IM injections with Nurse Mckinley and will meet regularly with Dr. Lisa \par \par Goal #2- Maintain overall stability \par Obj #1- The patient will attend Voodoo services regularly\par Obj #2- The patient will socialize with friends and family \par Obj #3- The patient will maintain daily structure by adhering to his budget that he set forth and will prepare healthy meals. \par  [Cognitive and/or Behavior Therapy] : Cognitive and/or Behavior Therapy  [Psychoeducation] : Psychoeducation  [Skills training (all types)] : Skills training (all types)  [Supportive Therapy] : Supportive Therapy [de-identified] : The patient attended his telehealth session with the writer via [employer]. The patient reported that he has been doing well overall with no periods of anxiety and or depression. The patient continues to maintain overall stability continuing to engage in Roman Catholic services, spending time with family members and fellowship with Roman Catholic members. The patient reports that he continues to practice conscientious eating and he has been steadily losing weight and improving his overall physical health. The writer commended the patient. The writer and the patient discussed the patient obtaining SNAP benefits and the writer explained to the patient that the writer will speak to the SNAP representative on behalf of the patient. The writer encouraged the patient to call the SNAP office and reschedule his phone interview. The writer will continue to provide the patient with support and encouragement where needed. The patient is scheduled for a follow up visit on 11/29/2022 at 3pm.  [Recommended Frequency of Visits: ____] : Recommended frequency of visits: [unfilled] [Return in ____ week(s)] : Return in [unfilled] week(s)

## 2022-10-24 NOTE — PLAN
[FreeTextEntry2] : Goal #1- Medication management\par Obj #1- The patient will attend regularly scheduled IM injections with Nurse Mckinley and will meet regularly with Dr. Lisa \par \par Goal #2- Maintain overall stability \par Obj #1- The patient will attend Sabianism services regularly\par Obj #2- The patient will socialize with friends and family \par Obj #3- The patient will maintain daily structure by adhering to his budget that he set forth and will prepare healthy meals. \par  [Cognitive and/or Behavior Therapy] : Cognitive and/or Behavior Therapy  [Psychoeducation] : Psychoeducation  [Skills training (all types)] : Skills training (all types)  [Supportive Therapy] : Supportive Therapy [de-identified] : The patient attended his telehealth session with the writer via [employer]. The patient reported that he has been doing well overall with no periods of anxiety and or depression. The patient continues to maintain overall stability continuing to engage in Buddhist services, spending time with family members and fellowship with Buddhist members. The patient reports that he continues to practice conscientious eating and he has been steadily losing weight and improving his overall physical health. The writer commended the patient. The writer and the patient discussed the patient obtaining SNAP benefits and the writer explained to the patient that the writer will speak to the SNAP representative on behalf of the patient. The writer encouraged the patient to call the SNAP office and reschedule his phone interview. The writer will continue to provide the patient with support and encouragement where needed. The patient is scheduled for a follow up visit on 11/29/2022 at 3pm.  [Recommended Frequency of Visits: ____] : Recommended frequency of visits: [unfilled] [Return in ____ week(s)] : Return in [unfilled] week(s)

## 2022-11-15 ENCOUNTER — NON-APPOINTMENT (OUTPATIENT)
Age: 27
End: 2022-11-15

## 2022-11-29 ENCOUNTER — OUTPATIENT (OUTPATIENT)
Dept: OUTPATIENT SERVICES | Facility: HOSPITAL | Age: 27
LOS: 1 days | Discharge: HOME | End: 2022-11-29

## 2022-11-29 ENCOUNTER — APPOINTMENT (OUTPATIENT)
Dept: PSYCHIATRY | Facility: CLINIC | Age: 27
End: 2022-11-29

## 2022-11-29 DIAGNOSIS — F20.0 PARANOID SCHIZOPHRENIA: ICD-10-CM

## 2022-11-29 NOTE — REASON FOR VISIT
[Home] : at home, [unfilled] , at the time of the visit. [Other Location: e.g. Home (Enter Location, City,State)___] : at [unfilled] [Verbal consent obtained from patient] : the patient, [unfilled] [Patient] : Patient [FreeTextEntry1] : psychotherapy follow up

## 2022-11-29 NOTE — PLAN
[FreeTextEntry2] : Goal #1- Medication management\par Obj #1- The patient will attend regularly scheduled IM injections with Nurse Mckinley and will meet regularly with Dr. Rueda \par \par Goal #2- Maintain overall stability \par Obj #1- The patient will attend Buddhism services regularly\par Obj #2- The patient will socialize with friends and family \par Obj #3- The patient will maintain daily structure by adhering to his budget that he set forth and will prepare healthy meals. \par  [Psychoeducation] : Psychoeducation  No significant past surgical history [Skills training (all types)] : Skills training (all types)  [Supportive Therapy] : Supportive Therapy [de-identified] : The patient attended his scheduled session with the writer via phone call. The patient was seen on video;however, the writer could not hear the patient. The patient explained that he has been doing well. He reported that he enjoyed Thanksgiving with his family members and he continues to be an active member of his [employer]. The patient explained that he keeps himself busy with the men's group at his local [employer] and socializing with his friends virtually. The patient reports that he and his father are working closely with an  to obtain SNAP and  dependent benefits. The patient reports overall mood stability and he is compliant with his injections. The writer will continue to provide the patient with support and encouragement where needed. The patient is scheduled for a follow up visit on 12/29/2022 at 3pm.  [Recommended Frequency of Visits: ____] : Recommended frequency of visits: [unfilled] [Return in ____ week(s)] : Return in [unfilled] week(s)

## 2022-12-29 ENCOUNTER — APPOINTMENT (OUTPATIENT)
Dept: PSYCHIATRY | Facility: CLINIC | Age: 27
End: 2022-12-29

## 2022-12-29 ENCOUNTER — OUTPATIENT (OUTPATIENT)
Dept: OUTPATIENT SERVICES | Facility: HOSPITAL | Age: 27
LOS: 1 days | Discharge: HOME | End: 2022-12-29

## 2022-12-29 DIAGNOSIS — F20.0 PARANOID SCHIZOPHRENIA: ICD-10-CM

## 2022-12-30 NOTE — PLAN
[Psychoeducation] : Psychoeducation  [Skills training (all types)] : Skills training (all types)  [Supportive Therapy] : Supportive Therapy [FreeTextEntry2] : Goal #1- Medication management\par Obj #1- The patient will attend regularly scheduled IM injections with Nurse Mckinley and will meet regularly with Dr. Rueda \par \par Goal #2- Maintain overall stability \par Obj #1- The patient will attend Gnosticism services regularly\par Obj #2- The patient will socialize with friends and family \par Obj #3- The patient will maintain daily structure by adhering to his budget that he set forth and will prepare healthy meals. \par  [de-identified] : The patient attended his scheduled session with the writer via phone call the writer could not hear the patient while on the telehealth session. The patient reported that he has been doing well overall. The patient reported that he has been spending more time with his sister and he has also been socializing with his sister's friends. The patient continues to engage with Amish and Bahai services virtually. The patient reports that while he has gained some weight over the holiday season, he plans on resuming practicing conscientious eating. The writer and the patient discussed ongoing financial literacy and the patient explained that he will be getting an extra check from his insurance company monthly and he plans on using the funds to add to his savings account. The writer commended the patient. The patient is adherent to his IM injection. The writer will continue to provide the patient with support and encouragement. The patient is scheduled for a follow up visit on 1/26/2023 at 3pm.  [Recommended Frequency of Visits: ____] : Recommended frequency of visits: [unfilled] [Return in ____ week(s)] : Return in [unfilled] week(s)

## 2022-12-30 NOTE — REASON FOR VISIT
[Home] : at home, [unfilled] , at the time of the visit. [Other Location: e.g. Home (Enter Location, City,State)___] : at [unfilled] [Self] : self [Patient] : Patient [This encounter was initiated by telehealth (audio with video) and converted to telephone (audio only) due to technical difficulties.] : This encounter was initiated by telehealth (audio with video) and converted to telephone (audio only) due to technical difficulties. [FreeTextEntry1] : psychotherapy follow up

## 2023-01-03 ENCOUNTER — OUTPATIENT (OUTPATIENT)
Dept: OUTPATIENT SERVICES | Facility: HOSPITAL | Age: 28
LOS: 1 days | Discharge: HOME | End: 2023-01-03

## 2023-01-03 ENCOUNTER — APPOINTMENT (OUTPATIENT)
Dept: PSYCHIATRY | Facility: CLINIC | Age: 28
End: 2023-01-03

## 2023-01-03 ENCOUNTER — NON-APPOINTMENT (OUTPATIENT)
Age: 28
End: 2023-01-03

## 2023-01-03 VITALS
RESPIRATION RATE: 18 BRPM | BODY MASS INDEX: 31.23 KG/M2 | WEIGHT: 199 LBS | HEIGHT: 67 IN | SYSTOLIC BLOOD PRESSURE: 118 MMHG | HEART RATE: 68 BPM | DIASTOLIC BLOOD PRESSURE: 76 MMHG

## 2023-01-03 NOTE — TREATMENT
[No Side Effects] : No side effects [FreeTextEntry4] : ( x )  Spoke to patient for COVID-19 Pre-appointment Screening.\par  \par 1. Do you have a fever, sore throat, cough, any difficulty breathing, loss of smell or taste, gastrointestinal symptoms or muscle aches?  no\par  \par 2. Have you had contact with coronavirus Disease 2019 (COVID 19) or had contact with a confirmed or suspected case of COVID 19 within the last 14 days?  no\par  \par 3. Did you travel outside of the [place] other than [place] in the last 14 days? no\par If you have traveled outside the [facility], have you quarantined for 7 days (with a test on day 3-5) or for 10days?  If quarantined for 7 days with test on day 3-5 or quarantined for 10 days travel history is not relevant and this is a “no” Response. no\par  \par 4. Have you traveled to the [place] and/or [place] in the last 21 days? no\par  \par  \par  \par Plan : ( x )   patient will be seen at the office as planned\par Channing was seen today for Invega Trinza 273mg IM in the left gluteal muscle. Area cleansed prior to and without signs or symptoms of adverse reaction after administration. Medication education reviewed with Channing he denies any side effects or concerns at this time. Channing reports his mood is "good". He is frendly and cooperative. Easily engaged in conversation. He also had an appointment with Dr. Rueda please refer to that note for a mental status. Channing will return to this clinic on 3/28 at 1:00 to meet with Dr. Rueda and this writer for injection. [facility]par

## 2023-01-03 NOTE — DISCUSSION/SUMMARY
[FreeTextEntry1] : Patient is a 27 year old male, single, unemployed, with history of Schizoaffective Disorder versus Schizophrenia, with history of remote IPP admissions, who presents for follow-up today.\par He presents as stable with good control of his illness, without any active positive or mood symptoms , without sense of hopelessness. No s/h ideations. Channing's daily functioning appears adequate for basic functioning, and he continues to attend Holiness services weekly. Patient unfortunately is resistant to be seen monthly-claiming he has high co pays for each visit but he continues to meet with his therapist monthly. No side effects from Invega Trinza observed or elicited. No signs of psychiatric decompensation noted today. He remains appropriate for outpatient level of care at this time.

## 2023-01-03 NOTE — HISTORY OF PRESENT ILLNESS
[Not Applicable] : Not applicable [FreeTextEntry1] : Patient reports feeling well with no acute psychiatric complaints. \par States he continues to live at home with his family, went grocery shopping earlier today. Reports getting along well with family with no issues.\par Reports he takes multivitamins daily, not taking more than recommended on package, has been exercising 2-3 times a week, stays hydrated. \par States that he continues to takes injection in part as a condition of his parents' in order for him to continue living at home. 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 Denies any suicidal or homicidal ideation at this time.  [FreeTextEntry2] : schizoaffective d/o vs schizophrenia [FreeTextEntry3] : invega trinza 273mg IM [id number]

## 2023-01-26 ENCOUNTER — OUTPATIENT (OUTPATIENT)
Dept: OUTPATIENT SERVICES | Facility: HOSPITAL | Age: 28
LOS: 1 days | Discharge: HOME | End: 2023-01-26

## 2023-01-26 ENCOUNTER — APPOINTMENT (OUTPATIENT)
Dept: PSYCHIATRY | Facility: CLINIC | Age: 28
End: 2023-01-26

## 2023-01-26 DIAGNOSIS — F20.0 PARANOID SCHIZOPHRENIA: ICD-10-CM

## 2023-02-07 NOTE — PLAN
[Psychoeducation] : Psychoeducation  [Skills training (all types)] : Skills training (all types)  [Supportive Therapy] : Supportive Therapy [Recommended Frequency of Visits: ____] : Recommended frequency of visits: [unfilled] [Return in ____ week(s)] : Return in [unfilled] week(s) [FreeTextEntry2] : Goal #1- Medication management\par Obj #1- The patient will attend regularly scheduled IM injections with Nurse Mckinley and will meet regularly with Dr. Rueda \par \par Goal #2- Maintain overall stability \par Obj #1- The patient will attend Rastafari services regularly\par Obj #2- The patient will socialize with friends and family \par Obj #3- The patient will maintain daily structure by adhering to his budget that he set forth and will prepare healthy meals. \par  [de-identified] : The patient attended his scheduled session with the writer via Zoom. The writer was unable to hear the patient on the [employer] platform. The patient reported that he continues to do well. The patient explained that he has been continues to keep himself busy and he has been engaging in exercise and conscientious eating. The patient continues to attend Mormonism services. The patient explained that with the increase of over the counter funding, the patient explained that this has been helpful to his financial situation as he is able to purchase additional necessities that he no loner needs to work into his social security budget. The patient is adherent with his IM injections. The writer will continue to provide the patient with support and encouragement where needed. The patient is scheduled for a follow up visit on 2/23/2022 at 3pm.

## 2023-02-07 NOTE — REASON FOR VISIT
[Patient preference] : as per patient preference [Continuing, patient seen in-person within last 12 months] : Telehealth services are continuing as patient has been seen in-person within last 12 months. [Telehealth (audio & video) - Individual/Group] : This visit was provided via telehealth using real-time 2-way audio visual technology. [Other Location: e.g. Home (Enter Location, City,State)___] : The provider was located at [unfilled]. [Home] : The patient, [unfilled], was located at home, [unfilled], at the time of the visit. [Verbal consent obtained from patient/other participant(s)] : Verbal consent for telehealth/telephonic services obtained from patient/other participant(s) [Patient] : Patient [FreeTextEntry4] : 3pm  [FreeTextEntry5] : 3:16pm [FreeTextEntry2] : 1/3/2023 [FreeTextEntry1] : psychotherapy follow up

## 2023-02-23 ENCOUNTER — OUTPATIENT (OUTPATIENT)
Dept: OUTPATIENT SERVICES | Facility: HOSPITAL | Age: 28
LOS: 1 days | End: 2023-02-23
Payer: MEDICARE

## 2023-02-23 ENCOUNTER — APPOINTMENT (OUTPATIENT)
Dept: PSYCHIATRY | Facility: CLINIC | Age: 28
End: 2023-02-23

## 2023-02-23 DIAGNOSIS — F20.0 PARANOID SCHIZOPHRENIA: ICD-10-CM

## 2023-02-23 PROCEDURE — 90832 PSYTX W PT 30 MINUTES: CPT | Mod: 95

## 2023-02-23 NOTE — REASON FOR VISIT
[Patient preference] : as per patient preference [Continuing, patient seen in-person within last 12 months] : Telehealth services are continuing as patient has been seen in-person within last 12 months. [Telehealth (audio & video) - Individual/Group] : This visit was provided via telehealth using real-time 2-way audio visual technology. [Other Location: e.g. Home (Enter Location, City,State)___] : The provider was located at [unfilled]. [Home] : The patient, [unfilled], was located at home, [unfilled], at the time of the visit. [Verbal consent obtained from patient/other participant(s)] : Verbal consent for telehealth/telephonic services obtained from patient/other participant(s) [Patient] : Patient [FreeTextEntry4] : 3pm  [FreeTextEntry5] : 3:17pm  [FreeTextEntry2] : 1/3/2023 [FreeTextEntry3] : patient requested telehealth and is appropriate for telehealth services  [FreeTextEntry1] : psychotherapy follow up

## 2023-02-23 NOTE — PLAN
[Psychoeducation] : Psychoeducation  [Skills training (all types)] : Skills training (all types)  [Supportive Therapy] : Supportive Therapy [FreeTextEntry2] : Goal #1- Medication management\par Obj #1- The patient will attend regularly scheduled IM injections with Nurse Mckinley and will meet regularly with Dr. Rueda \par \par Goal #2- Maintain overall stability \par Obj #1- The patient will attend Protestant services regularly\par Obj #2- The patient will socialize with friends and family \par Obj #3- The patient will maintain daily structure by adhering to his budget that he set forth and will prepare healthy meals. \par  [de-identified] : The patient attended his scheduled session with the writer via Zoom. The writer was unable to hear the patient on the [employer] platform. The patient explained that he has been maintaining overall stability by attending Congregational services virtually, socializing and speaking to his neighbors regularly, texting with Congregational friends and maintaining his ADLs. The patient explaiend that he has recently started taking Slim Fast protein shakes as a way to aide in weight loss. The writer and the patient discussed FAD dieting and alternative ways that the patient can aide in weight loss naturally including healthy eating and lifestyle changes. The patient explained that he has been utilizing his [employer] spending card to help pay for additional items that he needs throughout the month. The patient's SNAP application is on hold as he and his father file for the patient as a  dependent. The patient denied any psychosis, depression and or anxiety. The patient is scheduled for an IM injection in March 2023. The writer will continue to provide the patient with support and encouragement where needed. The patient is scheduled for a follow up visit on 3/23/2023 at 3pm.  [Recommended Frequency of Visits: ____] : Recommended frequency of visits: [unfilled] [Return in ____ week(s)] : Return in [unfilled] week(s)

## 2023-03-23 ENCOUNTER — OUTPATIENT (OUTPATIENT)
Dept: OUTPATIENT SERVICES | Facility: HOSPITAL | Age: 28
LOS: 1 days | End: 2023-03-23
Payer: MEDICARE

## 2023-03-23 ENCOUNTER — APPOINTMENT (OUTPATIENT)
Dept: PSYCHIATRY | Facility: CLINIC | Age: 28
End: 2023-03-23

## 2023-03-23 DIAGNOSIS — F20.0 PARANOID SCHIZOPHRENIA: ICD-10-CM

## 2023-03-23 PROCEDURE — 90832 PSYTX W PT 30 MINUTES: CPT | Mod: 95

## 2023-03-24 DIAGNOSIS — F20.0 PARANOID SCHIZOPHRENIA: ICD-10-CM

## 2023-03-27 ENCOUNTER — NON-APPOINTMENT (OUTPATIENT)
Age: 28
End: 2023-03-27

## 2023-03-27 NOTE — REASON FOR VISIT
[Patient preference] : as per patient preference [Starting, patient seen in-person within last 6 months] : Telehealth services are being started as patient has seen in-person within last 6 months. [Telehealth (audio & video) - Individual/Group] : This visit was provided via telehealth using real-time 2-way audio visual technology. [Other Location: e.g. Home (Enter Location, City,State)___] : The provider was located at [unfilled]. [Home] : The patient, [unfilled], was located at home, [unfilled], at the time of the visit. [Verbal consent obtained from patient/other participant(s)] : Verbal consent for telehealth/telephonic services obtained from patient/other participant(s) [Patient] : Patient [FreeTextEntry4] : 3pm [FreeTextEntry5] : 3:16pm [FreeTextEntry2] : 1/3/2023 [FreeTextEntry3] : patient requested telehealth sessions and is appropriate for telehealth services  [FreeTextEntry1] : psychotherapy follow up

## 2023-03-27 NOTE — PLAN
[Psychoeducation] : Psychoeducation  [Skills training (all types)] : Skills training (all types)  [Supportive Therapy] : Supportive Therapy [FreeTextEntry2] : Goal #1- Medication management\par Obj #1- The patient will attend regularly scheduled IM injections with Nurse Mckinley and will meet regularly with Dr. Rueda \par \par Goal #2- Maintain overall stability \par Obj #1- The patient will attend Scientologist services regularly\par Obj #2- The patient will socialize with friends and family \par Obj #3- The patient will maintain daily structure by adhering to his budget that he set forth and will prepare healthy meals. \par  [de-identified] : The patient attended his scheduled session with the writer via [employer] and then subsequent via Zoom. The writer could not hear the patient via [employer]. The patient reported overall stability. The patient noted that he continues to socialize with members of the men's support group at his [employer]aker. Patient noted that while he continues to attend Quaker services virtually, he is hopeful that he will resume going to Quaker in person once his father returns back to [facility]. The writer and the patient discussed ways that the patient has been engaging in activities outside of the home. Patient explained that he has been going on walks and he has been completing ADL activities that include going to various pharmacies where he using his OTC card. The writer commended the patient. The patient noted that he does not have any symptoms of anxiety and or depression. The patient is compliant with his IM injection. The writer will continue to provide the patient with support and encouragement where needed. The patient is scheduled for a follow up visit on 4/202/203 at 3pm.  [Recommended Frequency of Visits: ____] : Recommended frequency of visits: [unfilled] [Return in ____ week(s)] : Return in [unfilled] week(s)

## 2023-03-28 ENCOUNTER — APPOINTMENT (OUTPATIENT)
Dept: PSYCHIATRY | Facility: CLINIC | Age: 28
End: 2023-03-28

## 2023-03-28 ENCOUNTER — OUTPATIENT (OUTPATIENT)
Dept: OUTPATIENT SERVICES | Facility: HOSPITAL | Age: 28
LOS: 1 days | End: 2023-03-28
Payer: MEDICARE

## 2023-03-28 VITALS
HEIGHT: 67 IN | SYSTOLIC BLOOD PRESSURE: 118 MMHG | OXYGEN SATURATION: 98 % | BODY MASS INDEX: 30.61 KG/M2 | HEART RATE: 60 BPM | TEMPERATURE: 98.6 F | RESPIRATION RATE: 18 BRPM | DIASTOLIC BLOOD PRESSURE: 78 MMHG | WEIGHT: 195 LBS

## 2023-03-28 DIAGNOSIS — F20.0 PARANOID SCHIZOPHRENIA: ICD-10-CM

## 2023-03-28 DIAGNOSIS — F33.1 MAJOR DEPRESSIVE DISORDER, RECURRENT, MODERATE: ICD-10-CM

## 2023-03-28 PROCEDURE — 96372 THER/PROPH/DIAG INJ SC/IM: CPT

## 2023-03-28 PROCEDURE — 99213 OFFICE O/P EST LOW 20 MIN: CPT

## 2023-03-28 RX ORDER — PALIPERIDONE PALMITATE 273 MG/.875ML
273 INJECTION, SUSPENSION, EXTENDED RELEASE INTRAMUSCULAR
Qty: 0 | Refills: 0 | Status: COMPLETED | OUTPATIENT
Start: 2023-01-03

## 2023-03-28 NOTE — DISCUSSION/SUMMARY
[FreeTextEntry1] : Patient is a 27 year old male, single, unemployed, with history of Schizoaffective Disorder versus Schizophrenia, with history of remote IPP admissions, who presents for follow-up today.\par He presents as stable with good control of his illness, without any active positive or mood symptoms , without sense of hopelessness. No s/h ideations. Channing's daily functioning appears adequate for basic functioning, and he continues to attend Lutheran services weekly. Patient is resistant to be seen monthly-claiming he has high co-pays for each visit but he continues to meet with his therapist monthly. No side effects from Invega Trinza observed or elicited. No signs of psychiatric decompensation noted today. He remains appropriate for outpatient level of care at this time.

## 2023-03-28 NOTE — HISTORY OF PRESENT ILLNESS
[Not Applicable] : Not applicable [FreeTextEntry1] : Channing reports that he is "doing good", denying any concerns, feeling well overall.\par States that he remains busy at the house, spending time with his sister, cooking, exercising regularly.\par Reports sleeping well, 8 hours a night, feeling rested.\par Reports good appetite, eating well, currently 194 lbs and trying to bring weight down long-term.\par Reports he has long-term plan to work for \par Reports good relationship with family.\par He denies any acute stressors at this time.\par States his mood is "very good". He denies any anxiety.\par Denies any side effects from invega trinza injection, states he continues to get it as condition from his parents, agreeable to continue receiving injection.\par Reports he does not want to do any blood work at this time. Patient encouraged to follow up with PMD.\par Patient denies any symptoms suggestive of caryn or psychosis. Denies any suicidal or homicidal ideation at this time.  [FreeTextEntry2] : schizoaffective d/o vs schizophrenia [FreeTextEntry3] : invega trinza 273mg IM [id number]

## 2023-03-28 NOTE — TREATMENT
[No Side Effects] : No side effects [FreeTextEntry4] : Pt arrivedto clinic for administration of Invega Trinza 273 mg IM on the left gluteal muscle. Pt assessed, pt is axo4, pt denies any concerns regarding medication administered. Area was cleansed prior to administration and is without signs and symptoms of adverse reaction. Next IM is scheduled in 3 months.

## 2023-03-28 NOTE — PLAN
[No] : No [Medication education provided] : Medication education provided. [Rationale for medication choices, possible risks/precautions, benefits, alternative treatment choices, and consequences of non-treatment discussed] : Rationale for medication choices, possible risks/precautions, benefits, alternative treatment choices, and consequences of non-treatment discussed with patient/family/caregiver  [FreeTextEntry4] : Remain stable and not have any episodes [FreeTextEntry5] : -continue Invega Trinza 273mg IM [id number]\par -f/u in 3 months

## 2023-03-29 DIAGNOSIS — F20.0 PARANOID SCHIZOPHRENIA: ICD-10-CM

## 2023-04-20 ENCOUNTER — OUTPATIENT (OUTPATIENT)
Dept: OUTPATIENT SERVICES | Facility: HOSPITAL | Age: 28
LOS: 1 days | End: 2023-04-20
Payer: MEDICARE

## 2023-04-20 ENCOUNTER — APPOINTMENT (OUTPATIENT)
Dept: PSYCHIATRY | Facility: CLINIC | Age: 28
End: 2023-04-20

## 2023-04-20 DIAGNOSIS — F20.0 PARANOID SCHIZOPHRENIA: ICD-10-CM

## 2023-04-20 PROCEDURE — 90832 PSYTX W PT 30 MINUTES: CPT | Mod: 95

## 2023-04-20 NOTE — REASON FOR VISIT
[Patient preference] : as per patient preference [Continuing, patient not seen in-person within last 12 months (provide details below)] : Telehealth services are continuing, patient not seen in-person within last 12 months.  [Telehealth (audio & video) - Individual/Group] : This visit was provided via telehealth using real-time 2-way audio visual technology. [Other Location: e.g. Home (Enter Location, City,State)___] : The provider was located at [unfilled]. [Home] : The patient, [unfilled], was located at home, [unfilled], at the time of the visit. [Verbal consent obtained from patient/other participant(s)] : Verbal consent for telehealth/telephonic services obtained from patient/other participant(s) [FreeTextEntry4] : 3pm  [FreeTextEntry5] : 3:17pm  [FreeTextEntry3] : patient requested telehealth sessions and is appropriate for telehealth services  [Patient] : Patient [FreeTextEntry1] : psychotherapy follow up

## 2023-04-20 NOTE — PLAN
[FreeTextEntry2] : Goal #1- Medication management\par Obj #1- The patient will attend regularly scheduled IM injections with Nurse Mckinley and will meet regularly with Dr. Rueda \par \par Goal #2- Maintain overall stability \par Obj #1- The patient will attend Gnosticist services regularly\par Obj #2- The patient will socialize with friends and family \par Obj #3- The patient will maintain daily structure by adhering to his budget that he set forth and will prepare healthy meals. \par  [Psychoeducation] : Psychoeducation  [Skills training (all types)] : Skills training (all types)  [Supportive Therapy] : Supportive Therapy [de-identified] : The patient attended his scheduled session with the writer via [employer]. The patient noted that he continues to do and feel well. Patient noted that his father has since returned from [place] and he has been spending more time with him. Patient noted that since his father has been back, he has been spending more time outdoors and running errands. Patient explained that has visited his grandmother and he continues to attend Orthodoxy services and the men's support group. Patient reports that he has not experienced any feelings of anxiety, depression and psychosis. The patient reports that he continues to eat more conscientiously in hopes of overall physical well being. The patient is adherent with his IM injections. The writer will continue to provide the patient with support and encouragement where needed. The patient is scheduled for a follow up visit on 5/18/2023 at 3pm.  [Recommended Frequency of Visits: ____] : Recommended frequency of visits: [unfilled] [Return in ____ week(s)] : Return in [unfilled] week(s)

## 2023-04-21 DIAGNOSIS — F20.0 PARANOID SCHIZOPHRENIA: ICD-10-CM

## 2023-05-18 ENCOUNTER — APPOINTMENT (OUTPATIENT)
Dept: PSYCHIATRY | Facility: CLINIC | Age: 28
End: 2023-05-18

## 2023-05-25 ENCOUNTER — OUTPATIENT (OUTPATIENT)
Dept: OUTPATIENT SERVICES | Facility: HOSPITAL | Age: 28
LOS: 1 days | End: 2023-05-25

## 2023-05-25 ENCOUNTER — APPOINTMENT (OUTPATIENT)
Dept: PSYCHIATRY | Facility: CLINIC | Age: 28
End: 2023-05-25

## 2023-05-25 DIAGNOSIS — F20.0 PARANOID SCHIZOPHRENIA: ICD-10-CM

## 2023-05-26 DIAGNOSIS — F20.0 PARANOID SCHIZOPHRENIA: ICD-10-CM

## 2023-06-07 ENCOUNTER — NON-APPOINTMENT (OUTPATIENT)
Age: 28
End: 2023-06-07

## 2023-06-08 ENCOUNTER — OUTPATIENT (OUTPATIENT)
Dept: OUTPATIENT SERVICES | Facility: HOSPITAL | Age: 28
LOS: 1 days | End: 2023-06-08
Payer: MEDICARE

## 2023-06-08 ENCOUNTER — APPOINTMENT (OUTPATIENT)
Dept: PSYCHIATRY | Facility: CLINIC | Age: 28
End: 2023-06-08

## 2023-06-08 DIAGNOSIS — F20.0 PARANOID SCHIZOPHRENIA: ICD-10-CM

## 2023-06-08 PROCEDURE — 90834 PSYTX W PT 45 MINUTES: CPT | Mod: 95

## 2023-06-08 NOTE — REASON FOR VISIT
[Patient preference] : as per patient preference [Telehealth (audio & video) - Individual/Group] : This visit was provided via telehealth using real-time 2-way audio visual technology. [Other Location: e.g. Home (Enter Location, City,State)___] : The provider was located at [unfilled]. [Home] : The patient, [unfilled], was located at home, [unfilled], at the time of the visit. [Verbal consent obtained from patient/other participant(s)] : Verbal consent for telehealth/telephonic services obtained from patient/other participant(s) [Patient] : Patient [FreeTextEntry4] : 2:15 PM [FreeTextEntry5] : 2:52 PM [FreeTextEntry1] : Psychotherapy follow-up visit with the treatment diagnoses of Schizophrenia, paranoid type (295.30) (F20.0).\par \par

## 2023-06-08 NOTE — PLAN
[Motivational Interviewing] : Motivational Interviewing  [Supportive Therapy] : Supportive Therapy [FreeTextEntry2] : Goal (In patient's words): \par "Remain stable". \par \par Individual Therapy\par Objective A: The patient will attend Yazidism services regularly \par Objective B: The patient will socialize with friends and family \par Recommended Frequency of Visits: every 4 weeks. \par \par Medication Management\par Objective A: The patient will attend regularly scheduled IM injections and meet with Nurse and Psychiatrist.\par Recommended Frequency of Visits: every 3 months\par  [de-identified] : Channing was recently transferred to the writer to continue with individual therapy, and today was our first meeting. The patient reported doing well and attending scheduled injections/medication management appointments as expected. We discussed concerning triggers or symptoms, and the patient denied them. We reviewed his current functioning levels and support network. We decided to meet in person for our next session to review his collateral contact information and HIPPA release to assess his current treatment needs better. \par \par BH Plan/Practice Task(s):\par - Self-monitoring skills: Noticing limitations and problematic behaviors through self-reflection and reports by others.\par - "Maintain daily healthy routine"\par \par Skills Training: \par - Self-monitoring skills: Noticing limitations and problematic behaviors through self-reflection and reports by others.\par \par Recommended services & referrals made:\par - [facility]: Pt declined 6/8/2023\par \par Support Network (established contact consent/PHI Release):\par - Father: Will review HIPPA release for contact in the next session.\par \par Follow-up:\par - Return in 1 week(s) for in person visit to discuss emergency & collateral contact\par

## 2023-06-08 NOTE — PHYSICAL EXAM
[WNL] : within normal limits [Well groomed] : well groomed [Average] : average [Cooperative] : cooperative [Clear] : clear [[place]] : concrete [None Reported] : none reported [Difficulty acknowledging presence of psychiatric problems] : Difficulty acknowledging presence of psychiatric problems [FreeTextEntry8] : controlled mood

## 2023-06-09 DIAGNOSIS — F20.0 PARANOID SCHIZOPHRENIA: ICD-10-CM

## 2023-06-16 ENCOUNTER — OUTPATIENT (OUTPATIENT)
Dept: OUTPATIENT SERVICES | Facility: HOSPITAL | Age: 28
LOS: 1 days | End: 2023-06-16
Payer: MEDICARE

## 2023-06-16 ENCOUNTER — APPOINTMENT (OUTPATIENT)
Dept: PSYCHIATRY | Facility: CLINIC | Age: 28
End: 2023-06-16

## 2023-06-16 DIAGNOSIS — F20.0 PARANOID SCHIZOPHRENIA: ICD-10-CM

## 2023-06-16 PROCEDURE — 90834 PSYTX W PT 45 MINUTES: CPT

## 2023-06-16 NOTE — END OF VISIT
[Licensed Clinician] : Licensed Clinician [Duration of Psychotherapy Visit (minutes spent in synchronous communication): ____] : Duration of Psychotherapy Visit (minutes spent in synchronous communication): [unfilled] [Individual Psychotherapy for 38-52 minutes] : Individual Psychotherapy for 38 - 52 minutes

## 2023-06-17 DIAGNOSIS — F20.0 PARANOID SCHIZOPHRENIA: ICD-10-CM

## 2023-06-19 ENCOUNTER — NON-APPOINTMENT (OUTPATIENT)
Age: 28
End: 2023-06-19

## 2023-06-20 ENCOUNTER — APPOINTMENT (OUTPATIENT)
Dept: PSYCHIATRY | Facility: CLINIC | Age: 28
End: 2023-06-20

## 2023-06-20 ENCOUNTER — OUTPATIENT (OUTPATIENT)
Dept: OUTPATIENT SERVICES | Facility: HOSPITAL | Age: 28
LOS: 1 days | End: 2023-06-20
Payer: MEDICARE

## 2023-06-20 ENCOUNTER — APPOINTMENT (OUTPATIENT)
Dept: PSYCHIATRY | Facility: CLINIC | Age: 28
End: 2023-06-20
Payer: MEDICARE

## 2023-06-20 VITALS
HEART RATE: 84 BPM | RESPIRATION RATE: 18 BRPM | SYSTOLIC BLOOD PRESSURE: 110 MMHG | BODY MASS INDEX: 31.23 KG/M2 | DIASTOLIC BLOOD PRESSURE: 72 MMHG | WEIGHT: 199 LBS | HEIGHT: 67 IN | TEMPERATURE: 98.8 F

## 2023-06-20 DIAGNOSIS — F20.0 PARANOID SCHIZOPHRENIA: ICD-10-CM

## 2023-06-20 PROCEDURE — ZZZZZ: CPT

## 2023-06-20 PROCEDURE — 96372 THER/PROPH/DIAG INJ SC/IM: CPT

## 2023-06-20 PROCEDURE — 99213 OFFICE O/P EST LOW 20 MIN: CPT

## 2023-06-20 NOTE — HISTORY OF PRESENT ILLNESS
[Not Applicable] : Not applicable [FreeTextEntry1] : Patient reports he has been "good", has been spending his time cooking, cleaning, talking to sister,  watching star trek\par States that he met with tay, which went well. \par He reports his mood as "good", denies feeling down, denies any depressive or anxious symptoms occurring since last visit. States that his sleep is ok, sleeping 9-10 hours a night, feels rested afterwards. He reports eating well with no weight changes. States he has been doing some exercise in his room a few days a week, going out for walks as well.\par He reports feeling safe at home, denying any symptoms suggestive of psychosis.\par He denies any acute stressors at this time.\par He reports feeling well on Invega Trinza injection, denies issues or side effects from it, agreeable to continue medication.\par Despite encouragement, patient maintains that he does not want to follow up with PMD at this time or obtain any additional blood work as he is feeling well.\par Patient denies any symptoms suggestive of caryn. Denies any suicidal or homicidal ideation at this time. \par Transition of care discussed. [FreeTextEntry2] : schizoaffective d/o vs schizophrenia [FreeTextEntry3] : invega trinza 273mg IM [id number]

## 2023-06-20 NOTE — PLAN
[No] : No [Medication education provided] : Medication education provided. [Rationale for medication choices, possible risks/precautions, benefits, alternative treatment choices, and consequences of non-treatment discussed] : Rationale for medication choices, possible risks/precautions, benefits, alternative treatment choices, and consequences of non-treatment discussed with patient/family/caregiver  [FreeTextEntry4] : Remain stable and not have any episodes [FreeTextEntry5] : -continue Invega Trinza 273mg IM [id number]\par -f/u in 3 months with new psychiatrist

## 2023-06-20 NOTE — REASON FOR VISIT
Count Minor/Major Decisions Toward Mdm (Not Cosmetic)?: No
Add Postop Global No-Charge Code (45543)?: yes
Detail Level: Simple
[Patient] : Patient

## 2023-06-20 NOTE — TREATMENT
[No Side Effects] : No side effects [FreeTextEntry4] : Channing was seen today for Invega Trinza 273 mg IM in the right gluteal muscle. Area cleansed prior to and without signs or symptoms of adverse reaction after administration. Medication education reviewed with Channing he denies any side effects or concerns at this time. Channing was pleasant and friendly. He was informed of his assignment to another physician since Dr. Rueda was graduating. Emotional support and reassurance given. He denied any other concerns. He also had an appointment with Dr. Rueda please refer to that note for a mental status. Channing will return to this clinic on 9/12 to meet with his new provider and this writer for injection.

## 2023-06-20 NOTE — DISCUSSION/SUMMARY
[FreeTextEntry1] : Patient is a 27 year old male, single, unemployed, with history of Schizoaffective Disorder versus Schizophrenia, remote IPP admissions, who presents for follow-up today.\par He presents as stable with good control of his illness, without any active positive or mood symptoms, without sense of hopelessness. No s/h ideation reported. Channing's daily functioning appears adequate for basic functioning. Patient is resistant to be seen monthly-claiming he has high co-pays for each visit but he continues to meet with his therapist monthly. No side effects from Invega Trinza observed or elicited. patient was encouraged to follow up with PMD for health maintenance visits but is declining at this time. No signs of psychiatric decompensation noted today. He remains appropriate for outpatient level of care at this time.

## 2023-06-21 DIAGNOSIS — F20.0 PARANOID SCHIZOPHRENIA: ICD-10-CM

## 2023-06-26 NOTE — PLAN
[Motivational Interviewing] : Motivational Interviewing  [Supportive Therapy] : Supportive Therapy [Acceptance and Commitment Therapy] : Acceptance and Commitment Therapy  [[facility]] : [facility]  [Psychoeducation] : Psychoeducation  [FreeTextEntry2] : Goal (In patient's words): \par "Remain stable". \par \par Individual Therapy\par Objective A: The patient will attend Pentecostalism services regularly \par Objective B: The patient will socialize with friends and family \par Recommended Frequency of Visits: every 4 weeks. \par \par Medication Management\par Objective A: The patient will attend regularly scheduled IM injections and meet with Nurse and Psychiatrist.\par Recommended Frequency of Visits: every 3 months\par  [de-identified] : Channing confirmed taking medications as prescribed and attending all scheduled appointments. Channing reported doing well and had no significant concerns. The therapist completed the Depression, Anxiety, Stress Scale ([id number]-21) to assess the patient's ability to regulate emotions and stress. The results showed "Depression Score: 0, Depression Interpretation: Normal, Anxiety Score: 4, Anxiety Interpretation: Normal, Stress Score: 4, and Stress Interpretation: Normal." Considering his stability, we explored other treatment goals that he has in his mind. He mostly spoke about working on the "vitality of physical, spiritual, and mental health" to maintain his wellbeing of the present and future. We renewed the HIPPA release for contact consent with his father as the primary emergency and collateral point of contact. The patient declined to involve the father in any parts of the treatment, including building a treatment plan. We reviewed his medical f/u and other physical health-related information. Channing reported his last PCP visit was on 8/2022, but he did not want to see him regularly. The therapist provided brief psychoeducation on the benefits and purpose of annual check-ups, but the patient was not receptive. We will explore his thoughts and feelings further, considering his interest in optimizing his vitality. We decided to continue to explore the new treatment goal and plan in the next session. \par \par BH Plan/Practice Task(s):\par - Self-monitoring skills: Noticing limitations and problematic behaviors through self-reflection and reports by others.\par - "Maintain daily healthy routine"\par - Envision changes that he wants to attain within 1 year.\par \par Skills Training: \par - Self-monitoring skills: Noticing limitations and problematic behaviors through self-reflection and reports by others.\par \par \par Recommended services & referrals made:\par - [facility]: Pt declined 6/8/2023\par - F/U with PCP for annual physical for 2023 & thyroid issue: Pt declined 6/16/2023\par \par Support Network (established contact consent/PHI Release):\par - Ramiro Storm, father, [phone] \par \par Follow-up:\par - Return in 3 week(s) for treatment plan update.\par

## 2023-06-26 NOTE — PHYSICAL EXAM
[Well groomed] : well groomed [Average] : average [Cooperative] : cooperative [Clear] : clear [[place]] : concrete [WNL] : within normal limits [Difficulty acknowledging presence of psychiatric problems] : Difficulty acknowledging presence of psychiatric problems [Flat] : flat [Underproductive] : underproductive [Soft] : soft [de-identified] : N [FreeTextEntry8] : controlled mood

## 2023-06-26 NOTE — REASON FOR VISIT
[Patient preference] : as per patient preference [Patient] : Patient [FreeTextEntry4] : 1:30 PM [FreeTextEntry5] : 2:18 PM [FreeTextEntry1] : Psychotherapy follow-up visit with the treatment diagnoses of Schizophrenia, paranoid type (295.30) (F20.0).\par \par

## 2023-06-26 NOTE — RISK ASSESSMENT
[No, patient denies ideation or behavior] : No, patient denies ideation or behavior [Low acute suicide risk] : Low acute suicide risk [No] : No [FreeTextEntry9] : Low acute suicide risk

## 2023-06-26 NOTE — ADDENDUM
[FreeTextEntry1] : PRIMARY CARE \par Dr. Eligio Barney MD\par [address] [place] [address]\par [phone]\par The patient confirmed having regular visits with PCP and denied concern. \par \par MEDICAL CONDITION(S)\par - Pt denied.\par \par SUBSTANCE USE\par - Pt denied.\par - The patient denied any concerns or problem with substance use.\par - The patient denied substance-related hx of hospitalization, detox/rehab, or treatment.\par  \par SMOKING CESSATION\par Do you Smoke? No.\par \par

## 2023-07-06 ENCOUNTER — OUTPATIENT (OUTPATIENT)
Dept: OUTPATIENT SERVICES | Facility: HOSPITAL | Age: 28
LOS: 1 days | End: 2023-07-06
Payer: MEDICARE

## 2023-07-06 ENCOUNTER — APPOINTMENT (OUTPATIENT)
Dept: PSYCHIATRY | Facility: CLINIC | Age: 28
End: 2023-07-06

## 2023-07-06 DIAGNOSIS — F20.0 PARANOID SCHIZOPHRENIA: ICD-10-CM

## 2023-07-06 PROCEDURE — 90834 PSYTX W PT 45 MINUTES: CPT | Mod: 95

## 2023-07-06 NOTE — DISCUSSION/SUMMARY
[every ___ months] : every [unfilled] months [FreeTextEntry5] : "I want to improve my vitality for physical and mental health, financial independence and social life"  [FreeTextEntry4] : "I want to work on vitality of physical and mental health and expand social life"

## 2023-07-06 NOTE — PHYSICAL EXAM
[Well groomed] : well groomed [Cooperative] : cooperative [Flat] : flat [Clear] : clear [Underproductive] : underproductive [[place]] : concrete [None] : none [None Reported] : none reported [WNL] : within normal limits [Average] : average [FreeTextEntry8] : controlled mood

## 2023-07-06 NOTE — PLAN
[Acceptance and Commitment Therapy] : Acceptance and Commitment Therapy  [[facility]] : [facility]  [Motivational Interviewing] : Motivational Interviewing  [Psychoeducation] : Psychoeducation  [Supportive Therapy] : Supportive Therapy [FreeTextEntry2] : Goal (In patient's words): \par "Remain stable". \par \par Individual Therapy\par Objective A: The patient will attend [employer] services regularly \par Objective B: The patient will socialize with friends and family \par Recommended Frequency of Visits: every 4 weeks. \par \par Medication Management\par Objective A: The patient will attend regularly scheduled IM injections and meet with Nurse and Psychiatrist.\par Recommended Frequency of Visits: every 3 months\par  [de-identified] : Channing confirmed taking medications as prescribed and attending all scheduled appointments. Channing reported doing well and had no significant concerns. We continued further exploring his treatment goals and individual therapy expectations, and the therapist supported the patient in elaborating his interest in improving his "vitality." He talked about building healthier lifestyles and social life, which led to more specific goals in improving physical and emotional health and expanding social activities. We further discussed his current social engagement and types of activities, and areas of improvement. We will continue the treatment plan discussion in the next session. The therapist recommended the patient return every two weeks until completing the plan, but the patient declined. \par \par BH Plan/Practice Task(s):\par - Self-monitoring skills: Noticing limitations and problematic behaviors through self-reflection and reports by others.\par - "Maintain daily healthy routine"\par - Envision changes that he wants to attain within 1 year.\par \par Skills Training: \par - Self-monitoring skills: Noticing limitations and problematic behaviors through self-reflection and reports by others.\par \par \par Recommended services & referrals made:\par - [facility]: Pt declined 6/8/2023\par - F/U with PCP for annual physical for 2023 & thyroid issue: Pt declined 6/16/2023\par \par Support Network (established contact consent/PHI Release):\par - Ramiro Storm, father, [phone] \par \par Follow-up:\par - Return in 4 weeks\par

## 2023-07-06 NOTE — BEHAVIORAL HEALTH
[Prevent psychological harm to patient or another individual] : Prevent psychological harm to patient or another individual [FreeTextEntry2] : The patient has limited Insight with difficulty acknowledging the presence of psychiatric problems. We needed more time discussing the patient's treatment plan and personal expectations for individual therapy due to his limited acceptance of his illness and its impact on his functioning. [FreeTextEntry1] : Possible barrier against building therapeutic rapport

## 2023-07-06 NOTE — REASON FOR VISIT
[Patient preference] : as per patient preference [Patient] : Patient [Telehealth (audio & video) - Individual/Group] : This visit was provided via telehealth using real-time 2-way audio visual technology. [Other Location: e.g. Home (Enter Location, City,State)___] : The provider was located at [unfilled]. [Home] : The patient, [unfilled], was located at home, [unfilled], at the time of the visit. [Verbal consent obtained from patient/other participant(s)] : Verbal consent for telehealth/telephonic services obtained from patient/other participant(s) [FreeTextEntry5] : 3:03 PM [FreeTextEntry4] : 2:15 PM [FreeTextEntry1] : Psychotherapy follow-up visit with the treatment diagnoses of Schizophrenia, paranoid type (295.30) (F20.0).\par \par

## 2023-07-06 NOTE — ADDENDUM
[FreeTextEntry1] : PRIMARY CARE \par Dr. Eligio Barney MD\par [address] [place] [address]\par [phone]\par The patient confirmed having regular visits with PCP and denied concern. \par \par MEDICAL CONDITION(S)\par - Pt denied.\par \par SUBSTANCE USE\par - Pt denied.\par - The patient denied any concerns or problem with substance use.\par - The patient denied substance-related hx of hospitalization, detox/rehab, or treatment.\par  \par SMOKING CESSATION\par Do you Smoke? No.\par \par

## 2023-07-07 DIAGNOSIS — F20.0 PARANOID SCHIZOPHRENIA: ICD-10-CM

## 2023-08-03 ENCOUNTER — APPOINTMENT (OUTPATIENT)
Dept: PSYCHIATRY | Facility: CLINIC | Age: 28
End: 2023-08-03

## 2023-08-18 ENCOUNTER — OUTPATIENT (OUTPATIENT)
Dept: OUTPATIENT SERVICES | Facility: HOSPITAL | Age: 28
LOS: 1 days | End: 2023-08-18
Payer: MEDICARE

## 2023-08-18 ENCOUNTER — APPOINTMENT (OUTPATIENT)
Dept: PSYCHIATRY | Facility: CLINIC | Age: 28
End: 2023-08-18

## 2023-08-18 DIAGNOSIS — F20.0 PARANOID SCHIZOPHRENIA: ICD-10-CM

## 2023-08-18 PROCEDURE — 90837 PSYTX W PT 60 MINUTES: CPT

## 2023-08-18 NOTE — ADDENDUM
[FreeTextEntry1] : PRIMARY CARE  Dr. Eligio Barney  [place] [id number] [phone] The Last PCP Visit: 2022  MEDICAL CONDITION(S) - Pt denied.  SUBSTANCE USE - Pt denied. - The patient denied any concerns or problem with substance use. - The patient denied substance-related hx of hospitalization, detox/rehab, or treatment.   SMOKING CESSATION Do you Smoke? No.

## 2023-08-18 NOTE — ADDENDUM
[FreeTextEntry1] : PRIMARY CARE  Dr. Eligio Barney  [place] [id number] [phone] The Last PCP Visit: 2022  MEDICAL CONDITION(S) - Pt denied.  SUBSTANCE USE - Pt denied. - The patient denied any concerns or problem with substance use. - The patient denied substance-related hx of hospitalization, detox/rehab, or treatment.   SMOKING CESSATION Do you Smoke? No.

## 2023-08-18 NOTE — DISCUSSION/SUMMARY
[Plan Revision] : Plan Revision [Motivated to participate in treatment] : motivated to participate in treatment [Motivated to maintain or improve physical health] : motivated to maintain or improve physical health [Part of a supportive family] : part of a supportive family [Housing stability] : housing stability [English fluency] : English fluency [Access to safe outdoor spaces] : access to safe outdoor spaces [Mental Health] : Mental Health [Physical Health] : Physical Health [Social/Leisure] : Social/Leisure [FreeTextEntry1] : 8/18/2023 [FreeTextEntry2] : 8/18/2024 [FreeTextEntry3] : 12/6/2011 [Revised - Rationale] : Revised - Rationale: [Initial] : Initial [Continued - Progress made] : Continued - Progress made: [every ___ months] : every [unfilled] months [Improvement in symptoms as evidenced by:] : Improvement in symptoms as evidenced by: [A change in level of care is needed as evidenced by:] : A change in level of care is needed as evidenced by: [Other rationale for transition/discharge:] : Other rationale for transition/discharge: [None - Reason others did not participate:] : None - Reason others did not participate:  [Yes] : Yes [Psychiatric Provider/Prescriber] : Psychiatric Provider/Prescriber [Therapist] : Therapist [Supervisor (if needed)] : Supervisor [de-identified] : Pt will explore with various activities and experiences by using coping skills to maintain mental and physical health.  [de-identified] : We will discuss and review life events (x1-3) to identify thoughts and feelings to improve self-awareness and increase insight.  [de-identified] : 8/18/2024 [FreeTextEntry4] : (Continued Problem/Need I) [de-identified] : Maintain medication management and attend scheduled appointments. [de-identified] : 8/18/2024 [FreeTextEntry5] : Psychoeducation, Motivational Interviewing, Supportive Therapy [de-identified] : The patient expresses improvement in performing activities of daily living and/or says progress with initial complaint symptoms. In addition, the treatment team will conduct diagnose-based screenings as needed.  [de-identified] : If the patient is unable to perform activities of daily living and/or expresses suicidal ideation, a higher level of care will be considered. [de-identified] : Other rationale(s) for transition/discharge is/are granted/applied upon the patient DNC, relocation, self-termination, and/or requests for the treatment termination/transfer to another facility. [de-identified] : Pt declined.

## 2023-08-18 NOTE — REASON FOR VISIT
[Patient preference] : as per patient preference [Patient] : Patient [FreeTextEntry4] : 2:15 PM [FreeTextEntry5] : 3:15 PM [FreeTextEntry1] : Psychotherapy follow-up visit with the treatment diagnoses of Schizophrenia, paranoid type (295.30) (F20.0).\par  \par

## 2023-08-18 NOTE — END OF VISIT
[Licensed Clinician] : Licensed Clinician [Duration of Psychotherapy Visit (minutes spent in synchronous communication): ____] : Duration of Psychotherapy Visit (minutes spent in synchronous communication): [unfilled] [Individual Psychotherapy for 53+ minutes] : Individual Psychotherapy for 53+ minutes

## 2023-08-18 NOTE — BEHAVIORAL HEALTH
[Prevent psychological harm to patient or another individual] : Prevent psychological harm to patient or another individual [FreeTextEntry1] : Possible barrier against building therapeutic rapport [FreeTextEntry2] : The patient has limited Insight with difficulty acknowledging the presence of psychiatric problems. We needed more time discussing the patient's treatment plan and personal expectations for individual therapy due to his limited acceptance of his illness and its impact on his functioning.

## 2023-08-18 NOTE — DISCUSSION/SUMMARY
[Plan Revision] : Plan Revision [Motivated to participate in treatment] : motivated to participate in treatment [Motivated to maintain or improve physical health] : motivated to maintain or improve physical health [Part of a supportive family] : part of a supportive family [Housing stability] : housing stability [English fluency] : English fluency [Access to safe outdoor spaces] : access to safe outdoor spaces [Mental Health] : Mental Health [Physical Health] : Physical Health [Social/Leisure] : Social/Leisure [FreeTextEntry1] : 8/18/2023 [FreeTextEntry2] : 8/18/2024 [FreeTextEntry3] : 12/6/2011 [Revised - Rationale] : Revised - Rationale: [Initial] : Initial [Continued - Progress made] : Continued - Progress made: [every ___ months] : every [unfilled] months [Improvement in symptoms as evidenced by:] : Improvement in symptoms as evidenced by: [A change in level of care is needed as evidenced by:] : A change in level of care is needed as evidenced by: [Other rationale for transition/discharge:] : Other rationale for transition/discharge: [None - Reason others did not participate:] : None - Reason others did not participate:  [Yes] : Yes [Psychiatric Provider/Prescriber] : Psychiatric Provider/Prescriber [Therapist] : Therapist [Supervisor (if needed)] : Supervisor [de-identified] : Pt will explore with various activities and experiences by using coping skills to maintain mental and physical health.  [de-identified] : We will discuss and review life events (x1-3) to identify thoughts and feelings to improve self-awareness and increase insight.  [de-identified] : 8/18/2024 [FreeTextEntry4] : (Continued Problem/Need I) [de-identified] : Maintain medication management and attend scheduled appointments. [de-identified] : 8/18/2024 [FreeTextEntry5] : Psychoeducation, Motivational Interviewing, Supportive Therapy [de-identified] : The patient expresses improvement in performing activities of daily living and/or says progress with initial complaint symptoms. In addition, the treatment team will conduct diagnose-based screenings as needed.  [de-identified] : If the patient is unable to perform activities of daily living and/or expresses suicidal ideation, a higher level of care will be considered. [de-identified] : Other rationale(s) for transition/discharge is/are granted/applied upon the patient DNC, relocation, self-termination, and/or requests for the treatment termination/transfer to another facility. [de-identified] : Pt declined.

## 2023-08-19 DIAGNOSIS — F20.0 PARANOID SCHIZOPHRENIA: ICD-10-CM

## 2023-09-11 ENCOUNTER — NON-APPOINTMENT (OUTPATIENT)
Age: 28
End: 2023-09-11

## 2023-09-12 ENCOUNTER — APPOINTMENT (OUTPATIENT)
Dept: PSYCHIATRY | Facility: CLINIC | Age: 28
End: 2023-09-12
Payer: MEDICARE

## 2023-09-12 ENCOUNTER — OUTPATIENT (OUTPATIENT)
Dept: OUTPATIENT SERVICES | Facility: HOSPITAL | Age: 28
LOS: 1 days | End: 2023-09-12
Payer: MEDICARE

## 2023-09-12 VITALS
RESPIRATION RATE: 18 BRPM | DIASTOLIC BLOOD PRESSURE: 70 MMHG | SYSTOLIC BLOOD PRESSURE: 114 MMHG | HEART RATE: 80 BPM | HEIGHT: 67 IN | BODY MASS INDEX: 32.02 KG/M2 | WEIGHT: 204 LBS

## 2023-09-12 DIAGNOSIS — F20.0 PARANOID SCHIZOPHRENIA: ICD-10-CM

## 2023-09-12 PROCEDURE — ZZZZZ: CPT

## 2023-09-12 PROCEDURE — 96372 THER/PROPH/DIAG INJ SC/IM: CPT

## 2023-09-12 PROCEDURE — 99214 OFFICE O/P EST MOD 30 MIN: CPT | Mod: 25

## 2023-09-12 RX ORDER — PALIPERIDONE PALMITATE 273 MG/.875ML
273 INJECTION, SUSPENSION, EXTENDED RELEASE INTRAMUSCULAR
Qty: 0 | Refills: 0 | Status: COMPLETED | OUTPATIENT
Start: 2023-09-11

## 2023-09-12 RX ADMIN — PALIPERIDONE PALMITATE 0 MG/0.88ML: 273 INJECTION, SUSPENSION, EXTENDED RELEASE INTRAMUSCULAR at 00:00

## 2023-09-13 DIAGNOSIS — F20.0 PARANOID SCHIZOPHRENIA: ICD-10-CM

## 2023-09-19 ENCOUNTER — APPOINTMENT (OUTPATIENT)
Dept: PSYCHIATRY | Facility: CLINIC | Age: 28
End: 2023-09-19

## 2023-09-19 ENCOUNTER — OUTPATIENT (OUTPATIENT)
Dept: OUTPATIENT SERVICES | Facility: HOSPITAL | Age: 28
LOS: 1 days | End: 2023-09-19
Payer: MEDICARE

## 2023-09-19 DIAGNOSIS — F20.0 PARANOID SCHIZOPHRENIA: ICD-10-CM

## 2023-09-19 PROCEDURE — 90832 PSYTX W PT 30 MINUTES: CPT | Mod: 95

## 2023-09-20 DIAGNOSIS — F20.0 PARANOID SCHIZOPHRENIA: ICD-10-CM

## 2023-10-17 ENCOUNTER — OUTPATIENT (OUTPATIENT)
Dept: OUTPATIENT SERVICES | Facility: HOSPITAL | Age: 28
LOS: 1 days | End: 2023-10-17
Payer: MEDICARE

## 2023-10-17 ENCOUNTER — APPOINTMENT (OUTPATIENT)
Dept: PSYCHIATRY | Facility: CLINIC | Age: 28
End: 2023-10-17

## 2023-10-17 DIAGNOSIS — F20.0 PARANOID SCHIZOPHRENIA: ICD-10-CM

## 2023-10-17 PROCEDURE — 90832 PSYTX W PT 30 MINUTES: CPT | Mod: 95

## 2023-10-18 DIAGNOSIS — F20.0 PARANOID SCHIZOPHRENIA: ICD-10-CM

## 2023-11-14 ENCOUNTER — APPOINTMENT (OUTPATIENT)
Dept: PSYCHIATRY | Facility: CLINIC | Age: 28
End: 2023-11-14

## 2023-11-14 ENCOUNTER — OUTPATIENT (OUTPATIENT)
Dept: OUTPATIENT SERVICES | Facility: HOSPITAL | Age: 28
LOS: 1 days | End: 2023-11-14
Payer: MEDICARE

## 2023-11-14 DIAGNOSIS — F20.0 PARANOID SCHIZOPHRENIA: ICD-10-CM

## 2023-11-14 PROCEDURE — 90832 PSYTX W PT 30 MINUTES: CPT | Mod: 95

## 2023-11-15 DIAGNOSIS — F20.0 PARANOID SCHIZOPHRENIA: ICD-10-CM

## 2023-12-04 ENCOUNTER — NON-APPOINTMENT (OUTPATIENT)
Age: 28
End: 2023-12-04

## 2023-12-05 ENCOUNTER — APPOINTMENT (OUTPATIENT)
Dept: PSYCHIATRY | Facility: CLINIC | Age: 28
End: 2023-12-05
Payer: MEDICARE

## 2023-12-05 ENCOUNTER — OUTPATIENT (OUTPATIENT)
Dept: OUTPATIENT SERVICES | Facility: HOSPITAL | Age: 28
LOS: 1 days | End: 2023-12-05
Payer: MEDICARE

## 2023-12-05 VITALS
SYSTOLIC BLOOD PRESSURE: 120 MMHG | DIASTOLIC BLOOD PRESSURE: 78 MMHG | HEIGHT: 67 IN | RESPIRATION RATE: 18 BRPM | HEART RATE: 80 BPM | WEIGHT: 215 LBS | BODY MASS INDEX: 33.74 KG/M2

## 2023-12-05 DIAGNOSIS — F20.0 PARANOID SCHIZOPHRENIA: ICD-10-CM

## 2023-12-05 PROCEDURE — ZZZZZ: CPT

## 2023-12-05 PROCEDURE — 96372 THER/PROPH/DIAG INJ SC/IM: CPT | Mod: XP

## 2023-12-05 PROCEDURE — 99213 OFFICE O/P EST LOW 20 MIN: CPT

## 2023-12-05 RX ORDER — PALIPERIDONE PALMITATE 273 MG/.875ML
273 INJECTION, SUSPENSION, EXTENDED RELEASE INTRAMUSCULAR
Qty: 0 | Refills: 0 | Status: COMPLETED | OUTPATIENT
Start: 2023-09-12

## 2023-12-05 RX ADMIN — PALIPERIDONE PALMITATE 0 MG/0.88ML: 273 INJECTION, SUSPENSION, EXTENDED RELEASE INTRAMUSCULAR at 00:00

## 2023-12-06 DIAGNOSIS — F20.0 PARANOID SCHIZOPHRENIA: ICD-10-CM

## 2023-12-14 ENCOUNTER — APPOINTMENT (OUTPATIENT)
Dept: PSYCHIATRY | Facility: CLINIC | Age: 28
End: 2023-12-14

## 2023-12-14 ENCOUNTER — OUTPATIENT (OUTPATIENT)
Dept: OUTPATIENT SERVICES | Facility: HOSPITAL | Age: 28
LOS: 1 days | End: 2023-12-14
Payer: MEDICARE

## 2023-12-14 DIAGNOSIS — F20.0 PARANOID SCHIZOPHRENIA: ICD-10-CM

## 2023-12-14 PROCEDURE — 90832 PSYTX W PT 30 MINUTES: CPT | Mod: 95

## 2023-12-15 DIAGNOSIS — F20.0 PARANOID SCHIZOPHRENIA: ICD-10-CM

## 2023-12-15 NOTE — PLAN
[Acceptance and Commitment Therapy] : Acceptance and Commitment Therapy  [[facility]] : [facility]  [Motivational Interviewing] : Motivational Interviewing  [Psychoeducation] : Psychoeducation  [Supportive Therapy] : Supportive Therapy [FreeTextEntry2] : Goal (In patient's words): "I want to maintain my mental and physical health." - Objective A: We will discuss coping skills (x1 each session) to support Pt in engaging in new activities (x1-2, monthly).   - Objective B: We will discuss and review life events (x1-3) to identify thoughts and feelings to improve self-awareness and increase insight.  - Objective C: Maintain medication management and attend scheduled appointments.  Recommended Frequency of Visits - Medication Management: every 3 months - Individual Therapy: every 1 months.  [de-identified] : Channing confirmed taking medications as prescribed and attending all scheduled appointments. He reported doing well and had no significant concerns. The patient openly discussed his social activities since our last session, expressed self-content, and denied any obstacles or difficulty managing them. We discussed traveling experiences in his 20s and their positive impact. Also, we began to discuss his plan to stay active and healthy. We will continue the topic in the next session.    Plan/Practice Task(s): - Explore Jainism activities for September and October.  Skills Training:  - Self-monitoring skills: Noticing limitations and problematic behaviors through self-reflection and reports by others. - "Maintain daily healthy routine" - Envision changes that he wants to attain within 1 year.  Recommended services & referrals made: - [facility]: Pt declined 6/8/2023 - F/U with PCP for annual physical for 2023 & thyroid issue: Pt declined 6/16/2023  Support Network (established contact consent/PHI Release): - Ramiro Storm, father, [phone]   Follow-up: - Return in 1 month

## 2023-12-15 NOTE — REASON FOR VISIT
[Patient preference] : as per patient preference [Telehealth (audio & video) - Individual/Group] : This visit was provided via telehealth using real-time 2-way audio visual technology. [Other Location: e.g. Home (Enter Location, City,State)___] : The provider was located at [unfilled]. [Home] : The patient, [unfilled], was located at home, [unfilled], at the time of the visit. [Verbal consent obtained from patient/other participant(s)] : Verbal consent for telehealth/telephonic services obtained from patient/other participant(s) [Patient] : Patient [FreeTextEntry4] : 3:36 PM [FreeTextEntry5] : 3:59 PM [FreeTextEntry1] : Psychotherapy follow-up visit with the treatment diagnoses of Schizophrenia, paranoid type (295.30) (F20.0).\par  \par

## 2024-01-18 ENCOUNTER — OUTPATIENT (OUTPATIENT)
Dept: OUTPATIENT SERVICES | Facility: HOSPITAL | Age: 29
LOS: 1 days | End: 2024-01-18
Payer: MEDICARE

## 2024-01-18 ENCOUNTER — APPOINTMENT (OUTPATIENT)
Dept: PSYCHIATRY | Facility: CLINIC | Age: 29
End: 2024-01-18

## 2024-01-18 DIAGNOSIS — F20.0 PARANOID SCHIZOPHRENIA: ICD-10-CM

## 2024-01-18 PROCEDURE — 90832 PSYTX W PT 30 MINUTES: CPT | Mod: 95

## 2024-01-19 DIAGNOSIS — F20.0 PARANOID SCHIZOPHRENIA: ICD-10-CM

## 2024-01-19 NOTE — REASON FOR VISIT
[Patient preference] : as per patient preference [Telehealth (audio & video) - Individual/Group] : This visit was provided via telehealth using real-time 2-way audio visual technology. [Other Location: e.g. Home (Enter Location, City,State)___] : The provider was located at [unfilled]. [Home] : The patient, [unfilled], was located at home, [unfilled], at the time of the visit. [Verbal consent obtained from patient/other participant(s)] : Verbal consent for telehealth/telephonic services obtained from patient/other participant(s) [Patient] : Patient [FreeTextEntry4] : 3:31 PM [FreeTextEntry5] : 4:08 PM [FreeTextEntry1] : Psychotherapy follow-up visit with the treatment diagnoses of Schizophrenia, paranoid type (295.30) (F20.0).\par  \par

## 2024-01-19 NOTE — PLAN
[Acceptance and Commitment Therapy] : Acceptance and Commitment Therapy  [[facility]] : [facility]  [Motivational Interviewing] : Motivational Interviewing  [Psychoeducation] : Psychoeducation  [Supportive Therapy] : Supportive Therapy [FreeTextEntry2] : Goal (In patient's words): "I want to maintain my mental and physical health." - Objective A: We will discuss coping skills (x1 each session) to support Pt in engaging in new activities (x1-2, monthly).   - Objective B: We will discuss and review life events (x1-3) to identify thoughts and feelings to improve self-awareness and increase insight.  - Objective C: Maintain medication management and attend scheduled appointments.  Recommended Frequency of Visits - Medication Management: every 3 months - Individual Therapy: every 1 months.  [de-identified] : Channing confirmed taking medications as prescribed and attending all scheduled appointments. He reported doing well and had no significant concerns. The patient openly discussed his social activities since our last session, expressed self-content, and denied any obstacles or difficulty managing them. We discussed emotions and behaviors in a continuum state to identify those he wanted to decrease. In the following sessions, we will continue the topic to explore emotions and other coping skills.    Plan/Practice Task(s): - Explore social activities to prevent social isolation and nurture support network  Skills Training:  - Self-monitoring skills: Noticing limitations and problematic behaviors through self-reflection and reports by others. - "Maintain daily healthy routine" - Envision changes that he wants to attain within 1 year.  Recommended services & referrals made: - [facility]: Pt declined 6/8/2023 - F/U with PCP for annual physical for 2023 & thyroid issue: Pt declined 6/16/2023  Support Network (established contact consent/PHI Release): - Ramiro Storm, father, [phone]   Follow-up: - Return in 1 month

## 2024-02-15 ENCOUNTER — OUTPATIENT (OUTPATIENT)
Dept: OUTPATIENT SERVICES | Facility: HOSPITAL | Age: 29
LOS: 1 days | End: 2024-02-15
Payer: MEDICARE

## 2024-02-15 ENCOUNTER — APPOINTMENT (OUTPATIENT)
Dept: PSYCHIATRY | Facility: CLINIC | Age: 29
End: 2024-02-15

## 2024-02-15 DIAGNOSIS — F20.0 PARANOID SCHIZOPHRENIA: ICD-10-CM

## 2024-02-15 PROCEDURE — 90832 PSYTX W PT 30 MINUTES: CPT

## 2024-02-16 DIAGNOSIS — F20.0 PARANOID SCHIZOPHRENIA: ICD-10-CM

## 2024-02-16 NOTE — PLAN
[Acceptance and Commitment Therapy] : Acceptance and Commitment Therapy  [[facility]] : [facility]  [Motivational Interviewing] : Motivational Interviewing  [Psychoeducation] : Psychoeducation  [Supportive Therapy] : Supportive Therapy [FreeTextEntry2] : Goal (In patient's words): "I want to maintain my mental and physical health." - Objective A: We will discuss coping skills (x1 each session) to support Pt in engaging in new activities (x1-2, monthly).   - Objective B: We will discuss and review life events (x1-3) to identify thoughts and feelings to improve self-awareness and increase insight.  - Objective C: Maintain medication management and attend scheduled appointments.  Recommended Frequency of Visits - Medication Management: every 3 months - Individual Therapy: every 1 months.  [de-identified] : Channing confirmed taking medications as prescribed and attending all scheduled appointments. He reported doing well and had no significant concerns. The patient openly discussed his social activities since our last session, expressed self-content, and denied any obstacles or difficulty managing them. We reviewed a list of typical challenging behaviors (actions, thoughts, emotions) most people struggle with at times in their lives. We discussed his life experiences and personal interpretations of the problem behaviors in the list. The discussion helped him ensure he engaged in the most skillful/helpful behaviors to maintain his wellness. Channing will use the list to self-assess his behaviors.   Plan/Practice Task(s): - Explore social activities to prevent social isolation and nurture support network - Use the problem behavior list to self-assess his own actions, thoughts, and emotions.   Skills Training:  - Self-monitoring skills: Noticing limitations and problematic behaviors through self-reflection and reports by others. - "Maintain daily healthy routine" - Envision changes that he wants to attain within 1 year.  Recommended services & referrals made: - [facility]: Pt declined 6/8/2023 - F/U with PCP for annual physical for 2023 & thyroid issue: Pt declined 6/16/2023  Support Network (established contact consent/PHI Release): - Ramiro Storm, father, [phone]   Follow-up: - Return in 1 month

## 2024-02-16 NOTE — REASON FOR VISIT
[Patient preference] : as per patient preference [Telehealth (audio & video) - Individual/Group] : This visit was provided via telehealth using real-time 2-way audio visual technology. [Other Location: e.g. Home (Enter Location, City,State)___] : The provider was located at [unfilled]. [Home] : The patient, [unfilled], was located at home, [unfilled], at the time of the visit. [Verbal consent obtained from patient/other participant(s)] : Verbal consent for telehealth/telephonic services obtained from patient/other participant(s) [Patient] : Patient [FreeTextEntry4] : 4:14 PM [FreeTextEntry5] : 4:42 PM [FreeTextEntry1] : Psychotherapy follow-up visit with the treatment diagnoses of Schizophrenia, paranoid type (295.30) (F20.0).\par  \par

## 2024-02-22 ENCOUNTER — APPOINTMENT (OUTPATIENT)
Dept: PSYCHIATRY | Facility: CLINIC | Age: 29
End: 2024-02-22
Payer: MEDICARE

## 2024-02-22 ENCOUNTER — OUTPATIENT (OUTPATIENT)
Dept: OUTPATIENT SERVICES | Facility: HOSPITAL | Age: 29
LOS: 1 days | End: 2024-02-22
Payer: MEDICARE

## 2024-02-22 DIAGNOSIS — F20.0 PARANOID SCHIZOPHRENIA: ICD-10-CM

## 2024-02-22 PROCEDURE — ZZZZZ: CPT

## 2024-02-22 PROCEDURE — 99212 OFFICE O/P EST SF 10 MIN: CPT

## 2024-02-22 NOTE — PHYSICAL EXAM
[None] : none [Cooperative] : cooperative [Euthymic] : euthymic [Clear] : clear [Full] : full [Linear/Goal Directed] : linear/goal directed [Average] : average [WNL] : within normal limits [FreeTextEntry2] : Not formally assessed today. [FreeTextEntry8] : "Good"

## 2024-02-22 NOTE — REASON FOR VISIT
[Patient preference] : as per patient preference [Telehealth (audio & video) - Individual/Group] : This visit was provided via telehealth using real-time 2-way audio visual technology. [Medical Office: ([facility])___] : The provider was located at the medical office in [unfilled]. [Home] : The patient, [unfilled], was located at home, [unfilled], at the time of the visit. [Patient] : Patient [FreeTextEntry4] : 3:15PM [FreeTextEntry5] : 3:30PM [FreeTextEntry2] : 12/5/2023 [FreeTextEntry1] : Medication management, follow-up

## 2024-02-22 NOTE — DISCUSSION/SUMMARY
[FreeTextEntry1] : Patient is a 28 year old male, single, unemployed, with history of Schizoaffective Disorder versus Schizophrenia, remote [facility]PP admissions, who presents for follow-up today.  He presents as stable with good control of his illness, without any active positive or mood symptoms, without sense of hopelessness. No s/h ideation reported. Channing's daily functioning appears adequate for basic functioning. Patient is resistant to be seen monthly-claiming he has high co-pays for each visit but he continues to meet with his therapist monthly. No side effects from Invega Trinza observed or elicited. patient was encouraged to follow up with PMD for health maintenance visits but is declining at this time. No signs of psychiatric decompensation noted today. He remains appropriate for outpatient level of care at this time.

## 2024-02-22 NOTE — PLAN
[FreeTextEntry4] : Remain stable and not have any episodes [FreeTextEntry5] : - Continue Invega Trinza 273 mg IM [id number] (to be given next week on 2/29/2024) - Follow up in 3 months - Continue therapy/counseling with Claribel

## 2024-02-22 NOTE — HISTORY OF PRESENT ILLNESS
[Not Applicable] : Not applicable [FreeTextEntry1] : Patient was seen virtually (video-audio) over Teledoc and evaluated. Chart was reviewed.  Patient states he has been feeling "good" and denies having any acute issues or concerns. States he has been spending his time waking up, eating breakfast, playing games, using social media, and has been working on getting a family  ID (states father is working on the  ID down in [place]). Denies any depressed mood. States his sleep and appetite have been good. Denies visual/auditory hallucinations, paranoid ideation. Denies suicidal ideation. Asked patient about whether he follows with a PCP and gets routine blood work (patient previously reported his PCP is Dr. Barney). Informed patient of the importance of routine blood work, especially lipid profile and A1c while he is on Invega. Patient states he does not want to get blood work, citing "patient rights" to refuse. Informed patient that if he changes his mind, he can let this writer know and lab work can be ordered. States he has been feeling well on Invega Trinza injection, and denies any side-effects such as muscle tremors, soreness, stiffness, or abnormal rayna-buccal-facial movements. States agreeable to continue medication at current dose (273mg IM [id number]).  [FreeTextEntry2] : schizoaffective d/o vs schizophrenia [FreeTextEntry3] : invega trinza 273mg IM [id number]

## 2024-02-23 DIAGNOSIS — F20.0 PARANOID SCHIZOPHRENIA: ICD-10-CM

## 2024-02-28 ENCOUNTER — NON-APPOINTMENT (OUTPATIENT)
Age: 29
End: 2024-02-28

## 2024-02-29 ENCOUNTER — APPOINTMENT (OUTPATIENT)
Dept: PSYCHIATRY | Facility: CLINIC | Age: 29
End: 2024-02-29

## 2024-02-29 ENCOUNTER — OUTPATIENT (OUTPATIENT)
Dept: OUTPATIENT SERVICES | Facility: HOSPITAL | Age: 29
LOS: 1 days | End: 2024-02-29
Payer: MEDICARE

## 2024-02-29 VITALS
BODY MASS INDEX: 32.02 KG/M2 | RESPIRATION RATE: 18 BRPM | HEART RATE: 80 BPM | HEIGHT: 67 IN | SYSTOLIC BLOOD PRESSURE: 118 MMHG | DIASTOLIC BLOOD PRESSURE: 76 MMHG | WEIGHT: 204 LBS

## 2024-02-29 DIAGNOSIS — F20.0 PARANOID SCHIZOPHRENIA: ICD-10-CM

## 2024-02-29 PROCEDURE — 96372 THER/PROPH/DIAG INJ SC/IM: CPT

## 2024-02-29 RX ORDER — PALIPERIDONE PALMITATE 273 MG/.875ML
273 INJECTION, SUSPENSION, EXTENDED RELEASE INTRAMUSCULAR
Qty: 0 | Refills: 0 | Status: COMPLETED | OUTPATIENT
Start: 2023-12-05

## 2024-02-29 NOTE — TREATMENT
[No Side Effects] : No side effects [FreeTextEntry4] : Channing was seen today for Invega Trinza 273mg IM in the left gluteal muscle. Area cleansed prior to and without signs or symptoms of adverse reaction after administration. Medication education reviewed with Channing he denies any side effects or concerns at this time. Channing reports his mood is "good". He continues to meet with Claribel monthly. He had an appointment with Dr. Sanders 2/22. He is calm and friendly. Affect appropriate. Eye contact and adl's are good. He continues to "think about" returning to PCP. He denies any dangerous ideations. Channing will return to this clinic on 5/22 to meet with Dr. Sanders and this writer for injection.

## 2024-03-01 DIAGNOSIS — F20.0 PARANOID SCHIZOPHRENIA: ICD-10-CM

## 2024-03-05 RX ORDER — PALIPERIDONE PALMITATE 273 MG/.875ML
273 INJECTION, SUSPENSION, EXTENDED RELEASE INTRAMUSCULAR
Qty: 1 | Refills: 5 | Status: ACTIVE | COMMUNITY
Start: 1900-01-01 | End: 1900-01-01

## 2024-03-14 ENCOUNTER — APPOINTMENT (OUTPATIENT)
Dept: PSYCHIATRY | Facility: CLINIC | Age: 29
End: 2024-03-14

## 2024-03-14 ENCOUNTER — OUTPATIENT (OUTPATIENT)
Dept: OUTPATIENT SERVICES | Facility: HOSPITAL | Age: 29
LOS: 1 days | End: 2024-03-14
Payer: MEDICARE

## 2024-03-14 DIAGNOSIS — F20.0 PARANOID SCHIZOPHRENIA: ICD-10-CM

## 2024-03-14 PROCEDURE — 90832 PSYTX W PT 30 MINUTES: CPT

## 2024-03-15 DIAGNOSIS — F20.0 PARANOID SCHIZOPHRENIA: ICD-10-CM

## 2024-03-15 NOTE — REASON FOR VISIT
[Patient preference] : as per patient preference [Telehealth (audio & video) - Individual/Group] : This visit was provided via telehealth using real-time 2-way audio visual technology. [Other Location: e.g. Home (Enter Location, City,State)___] : The provider was located at [unfilled]. [Home] : The patient, [unfilled], was located at home, [unfilled], at the time of the visit. [Verbal consent obtained from patient/other participant(s)] : Verbal consent for telehealth/telephonic services obtained from patient/other participant(s) [Patient] : Patient [FreeTextEntry5] : 3:55 PM [FreeTextEntry4] : 3:30 PM [FreeTextEntry1] : Psychotherapy follow-up visit with the treatment diagnoses of Schizophrenia, paranoid type (295.30) (F20.0).\par  \par

## 2024-03-15 NOTE — RISK ASSESSMENT
[No, patient denies ideation or behavior] : No, patient denies ideation or behavior [No] : No [Low acute suicide risk] : Low acute suicide risk [FreeTextEntry9] : Low acute suicide risk

## 2024-03-15 NOTE — PLAN
[Acceptance and Commitment Therapy] : Acceptance and Commitment Therapy  [[facility]] : [facility]  [Psychoeducation] : Psychoeducation  [Motivational Interviewing] : Motivational Interviewing  [Supportive Therapy] : Supportive Therapy [FreeTextEntry2] : Goal (In patient's words): "I want to maintain my mental and physical health." - Objective A: We will discuss coping skills (x1 each session) to support Pt in engaging in new activities (x1-2, monthly).   - Objective B: We will discuss and review life events (x1-3) to identify thoughts and feelings to improve self-awareness and increase insight.  - Objective C: Maintain medication management and attend scheduled appointments.  Recommended Frequency of Visits - Medication Management: every 3 months - Individual Therapy: every 1 months.  [de-identified] : Channing confirmed taking medications as prescribed and attending all scheduled appointments. He reported doing well. The patient openly discussed his social activities since our last session, expressed self-content, and denied any obstacles or difficulty managing them. We discussed ways of self-assessing and identifying problem behaviors (actions, thoughts, and behaviors) by noticing their outcomes, consequences, and effects on our goals. Also, we discussed the need for building new skills and ways of attaining them. We will continue with the topic in the next session.    Plan/Practice Task(s): - Explore social activities to prevent social isolation and nurture support network - Use the problem behavior list to self-assess his own actions, thoughts, and emotions.   Skills Training:  - Self-monitoring skills: Noticing limitations and problematic behaviors through self-reflection and reports by others. - "Maintain daily healthy routine" - Envision changes that he wants to attain within 1 year.  Recommended services & referrals made: - [facility]: Pt declined 6/8/2023 - F/U with PCP for annual physical for 2023 & thyroid issue: Pt declined 6/16/2023  Support Network (established contact consent/PHI Release): - Ramiro Storm, father, [phone]   Follow-up: - Return in 1 month

## 2024-04-18 ENCOUNTER — APPOINTMENT (OUTPATIENT)
Dept: PSYCHIATRY | Facility: CLINIC | Age: 29
End: 2024-04-18

## 2024-04-18 ENCOUNTER — OUTPATIENT (OUTPATIENT)
Dept: OUTPATIENT SERVICES | Facility: HOSPITAL | Age: 29
LOS: 1 days | End: 2024-04-18
Payer: MEDICARE

## 2024-04-18 DIAGNOSIS — F20.0 PARANOID SCHIZOPHRENIA: ICD-10-CM

## 2024-04-18 PROCEDURE — 90832 PSYTX W PT 30 MINUTES: CPT | Mod: 95

## 2024-04-18 NOTE — PLAN
[FreeTextEntry2] : Goal (In patient's words): "I want to maintain my mental and physical health." - Objective A: We will discuss coping skills (x1 each session) to support Pt in engaging in new activities (x1-2, monthly).   - Objective B: We will discuss and review life events (x1-3) to identify thoughts and feelings to improve self-awareness and increase insight.  - Objective C: Maintain medication management and attend scheduled appointments.  Recommended Frequency of Visits - Medication Management: every 3 months - Individual Therapy: every 1 months.  [Acceptance and Commitment Therapy] : Acceptance and Commitment Therapy  [[facility] [facility]] : [facility]  [Motivational Interviewing] : Motivational Interviewing  [Psychoeducation] : Psychoeducation  [Supportive Therapy] : Supportive Therapy [de-identified] : Channing confirmed taking medications as prescribed and attending all scheduled appointments. He reported doing well. The patient openly discussed his social activities since our last session, expressed self-content, and denied any obstacles or difficulty managing them. We discussed ways of self-assessing and identifying problem behaviors (actions, thoughts, and behaviors) by noticing their outcomes, consequences, and effects on our goals. Also, we discussed the need for building new skills and ways of attaining them. We will continue with the topic in the next session.    Plan/Practice Task(s): - Explore social activities to prevent social isolation and nurture support network - Use the problem behavior list to self-assess his own actions, thoughts, and emotions.   Skills Training:  - Self-monitoring skills: Noticing limitations and problematic behaviors through self-reflection and reports by others. - "Maintain daily healthy routine" - Envision changes that he wants to attain within 1 year.  Recommended services & referrals made: - [facility]: Pt declined 6/8/2023 - F/U with PCP for annual physical for 2023 & thyroid issue: Pt declined 6/16/2023  Support Network (established contact consent/PHI Release): - Ramiro Storm, father, [phone]   Follow-up: - Return in 1 month

## 2024-04-18 NOTE — RISK ASSESSMENT
[No, patient denies ideation or behavior] : No, patient denies ideation or behavior [FreeTextEntry9] : Low acute suicide risk [Low acute suicide risk] : Low acute suicide risk [No] : No

## 2024-04-18 NOTE — REASON FOR VISIT
[Patient preference] : as per patient preference [Telehealth (audio & video) - Individual/Group] : This visit was provided via telehealth using real-time 2-way audio visual technology. [Other Location: e.g. Home (Enter Location, City,State)___] : The provider was located at [unfilled]. [Home] : The patient, [unfilled], was located at home, [unfilled], at the time of the visit. [Verbal consent obtained from patient/other participant(s)] : Verbal consent for telehealth/telephonic services obtained from patient/other participant(s) [FreeTextEntry4] : 3:30 PM [FreeTextEntry5] : 3:55 PM [Patient] : Patient [FreeTextEntry1] : Psychotherapy follow-up visit with the treatment diagnoses of Schizophrenia, paranoid type (295.30) (F20.0).\par  \par

## 2024-04-19 DIAGNOSIS — F20.0 PARANOID SCHIZOPHRENIA: ICD-10-CM

## 2024-05-22 ENCOUNTER — OUTPATIENT (OUTPATIENT)
Dept: OUTPATIENT SERVICES | Facility: HOSPITAL | Age: 29
LOS: 1 days | End: 2024-05-22
Payer: MEDICARE

## 2024-05-22 ENCOUNTER — APPOINTMENT (OUTPATIENT)
Dept: PSYCHIATRY | Facility: CLINIC | Age: 29
End: 2024-05-22
Payer: MEDICARE

## 2024-05-22 VITALS
DIASTOLIC BLOOD PRESSURE: 74 MMHG | WEIGHT: 204 LBS | BODY MASS INDEX: 32.02 KG/M2 | HEIGHT: 67 IN | HEART RATE: 84 BPM | SYSTOLIC BLOOD PRESSURE: 110 MMHG | RESPIRATION RATE: 18 BRPM

## 2024-05-22 DIAGNOSIS — F20.0 PARANOID SCHIZOPHRENIA: ICD-10-CM

## 2024-05-22 PROCEDURE — 96372 THER/PROPH/DIAG INJ SC/IM: CPT

## 2024-05-22 PROCEDURE — ZZZZZ: CPT

## 2024-05-22 PROCEDURE — 99212 OFFICE O/P EST SF 10 MIN: CPT | Mod: 25

## 2024-05-22 RX ORDER — PALIPERIDONE PALMITATE 273 MG/.875ML
273 INJECTION, SUSPENSION, EXTENDED RELEASE INTRAMUSCULAR
Qty: 0 | Refills: 0 | Status: COMPLETED | OUTPATIENT
Start: 2024-03-05

## 2024-05-22 RX ADMIN — PALIPERIDONE PALMITATE 0 MG/0.88ML: 273 INJECTION, SUSPENSION, EXTENDED RELEASE INTRAMUSCULAR at 00:00

## 2024-05-22 NOTE — PLAN
[FreeTextEntry4] : Remain stable and not have any episodes [FreeTextEntry5] : - Continue Invega Trinza 273 mg IM [id number] - Follow up in 3 months for next administration of Invega Trinza 273 mg - Continue psychotherapy appointments with clinic therapist - Transition of care after [place] discussed

## 2024-05-22 NOTE — PHYSICAL EXAM
[None] : none [Cooperative] : cooperative [Euthymic] : euthymic [Full] : full [Clear] : clear [Linear/Goal Directed] : linear/goal directed [Average] : average [WNL] : within normal limits [FreeTextEntry2] : Not formally assessed today. [FreeTextEntry8] : "Good"

## 2024-05-22 NOTE — HISTORY OF PRESENT ILLNESS
[Not Applicable] : Not applicable [FreeTextEntry1] : Patient was seen in-person and evaluated. Chart was reviewed.  Patient states he has been feeling "good" and denies having any acute issues or concerns. Denies any depressed mood. States his sleep and appetite have been good. Denies visual/auditory hallucinations, paranoid ideation. States he has been feeling well on the Invega Trinza injection, and denies any side-effects such as muscle tremors, soreness, stiffness, or abnormal rayna-buccal-facial movements. States agreeable to continue medication at current dose (273mg IM [id number]). Transition of care after June discussed. [FreeTextEntry2] : schizoaffective d/o vs schizophrenia [FreeTextEntry3] : invega trinza 273mg IM [id number]

## 2024-05-22 NOTE — TREATMENT
[No Side Effects] : No side effects [FreeTextEntry4] : Channing was seen today for Invega Trinza 273 mg IM in the left gluteal muscle. Area cleansed prior to and without signs or symptoms of adverse reaction after administration. Medication education reviewed with Channing he denies any side effects or concerns at this tiime. Channing reports his mood is "good and I got my new  ID it had a tap thing too it looks really nice". Emotional support and reassurance given. Channing was cooperative and friendly during appointment. He also had an appointment with Dr. Phillipanous please refer to that note for a mental status. Channing will return to this clinic on 8/15 to meet with his new Psychiatrist and this writer for injection. Also reinforced to him to continue monthly meetings with is therapist;

## 2024-05-23 DIAGNOSIS — F20.0 PARANOID SCHIZOPHRENIA: ICD-10-CM

## 2024-05-31 ENCOUNTER — APPOINTMENT (OUTPATIENT)
Dept: PSYCHIATRY | Facility: CLINIC | Age: 29
End: 2024-05-31

## 2024-05-31 ENCOUNTER — OUTPATIENT (OUTPATIENT)
Dept: OUTPATIENT SERVICES | Facility: HOSPITAL | Age: 29
LOS: 1 days | End: 2024-05-31
Payer: MEDICARE

## 2024-05-31 DIAGNOSIS — F20.0 PARANOID SCHIZOPHRENIA: ICD-10-CM

## 2024-05-31 PROCEDURE — 90832 PSYTX W PT 30 MINUTES: CPT

## 2024-05-31 NOTE — REASON FOR VISIT
[Patient preference] : as per patient preference [Telehealth (audio & video) - Individual/Group] : This visit was provided via telehealth using real-time 2-way audio visual technology. [Other Location: e.g. Home (Enter Location, City,State)___] : The provider was located at [unfilled]. [Home] : The patient, [unfilled], was located at home, [unfilled], at the time of the visit. [Verbal consent obtained from patient/other participant(s)] : Verbal consent for telehealth/telephonic services obtained from patient/other participant(s) [Patient] : Patient [FreeTextEntry4] : 4:03 PM [FreeTextEntry5] : 4:28 PM [FreeTextEntry1] : Psychotherapy follow-up visit with the treatment diagnoses of Schizophrenia, paranoid type (295.30) (F20.0).\par  \par

## 2024-05-31 NOTE — PLAN
[Acceptance and Commitment Therapy] : Acceptance and Commitment Therapy  [[facility]] : [facility]  [Motivational Interviewing] : Motivational Interviewing  [Psychoeducation] : Psychoeducation  [Supportive Therapy] : Supportive Therapy [FreeTextEntry2] : Goal (In patient's words): "I want to maintain my mental and physical health." - Objective A: We will discuss coping skills (x1 each session) to support Pt in engaging in new activities (x1-2, monthly).   - Objective B: We will discuss and review life events (x1-3) to identify thoughts and feelings to improve self-awareness and increase insight.  - Objective C: Maintain medication management and attend scheduled appointments.  Recommended Frequency of Visits - Medication Management: every 3 months - Individual Therapy: every 1 months.  [de-identified] : Channing reported taking all medications as prescribed and attending all scheduled appointments. The patient reported a fair mood and is putting effort into maintaining healthy daily routines. We revisited the chain analysis to discuss his current behaviors, and the patient shared how he uses his past experiences to choose helpful behaviors by considering the consequences/outcomes of his behaviors (actions, thoughts, and emotions). We will continue to explore his daily routines and significant events to review his symptom management in the next session.    Plan/Practice Task(s): - Explore social activities to prevent social isolation and nurture support network - Use the problem behavior list to self-assess his own actions, thoughts, and emotions.   Skills Training:  - Self-monitoring skills: Noticing limitations and problematic behaviors through self-reflection and reports by others. - "Maintain daily healthy routine" - Envision changes that he wants to attain within 1 year.  Recommended services & referrals made: - [facility]: Pt declined 6/8/2023 - F/U with PCP for annual physical for 2023 & thyroid issue: Pt declined 6/16/2023  Support Network (established contact consent/PHI Release): - Ramiro Storm, father, [phone]   Follow-up: - Return in 1 month

## 2024-06-01 DIAGNOSIS — F20.0 PARANOID SCHIZOPHRENIA: ICD-10-CM

## 2024-06-07 ENCOUNTER — NON-APPOINTMENT (OUTPATIENT)
Age: 29
End: 2024-06-07

## 2024-06-20 ENCOUNTER — APPOINTMENT (OUTPATIENT)
Dept: PSYCHIATRY | Facility: CLINIC | Age: 29
End: 2024-06-20

## 2024-06-20 ENCOUNTER — OUTPATIENT (OUTPATIENT)
Dept: OUTPATIENT SERVICES | Facility: HOSPITAL | Age: 29
LOS: 1 days | End: 2024-06-20
Payer: MEDICARE

## 2024-06-20 DIAGNOSIS — F20.0 PARANOID SCHIZOPHRENIA: ICD-10-CM

## 2024-06-20 PROCEDURE — 90832 PSYTX W PT 30 MINUTES: CPT

## 2024-06-20 NOTE — REASON FOR VISIT
[Patient preference] : as per patient preference [Telehealth (audio & video) - Individual/Group] : This visit was provided via telehealth using real-time 2-way audio visual technology. [Home] : The patient, [unfilled], was located at home, [unfilled], at the time of the visit. [Verbal consent obtained from patient/other participant(s)] : Verbal consent for telehealth/telephonic services obtained from patient/other participant(s) [Patient] : Patient [Other Location: e.g. Home (Enter Location, City,State)___] : The provider was located at [unfilled]. [FreeTextEntry4] : 3:32 PM [FreeTextEntry5] : 3:49 PM [FreeTextEntry1] : Psychotherapy follow-up visit with the treatment diagnoses of Schizophrenia, paranoid type (295.30) (F20.0).\par  \par

## 2024-06-20 NOTE — PLAN
[Acceptance and Commitment Therapy] : Acceptance and Commitment Therapy  [[facility] [facility]] : [facility]  [Motivational Interviewing] : Motivational Interviewing  [Psychoeducation] : Psychoeducation  [Supportive Therapy] : Supportive Therapy [FreeTextEntry2] : Goal (In patient's words): "I want to maintain my mental and physical health." - Objective A: We will discuss coping skills (x1 each session) to support Pt in engaging in new activities (x1-2, monthly).   - Objective B: We will discuss and review life events (x1-3) to identify thoughts and feelings to improve self-awareness and increase insight.  - Objective C: Maintain medication management and attend scheduled appointments.  Recommended Frequency of Visits - Medication Management: every 3 months - Individual Therapy: every 1 months.  [de-identified] : Channing reported taking all medications as prescribed and attending all scheduled appointments. The patient reported a fair mood and is putting effort into maintaining healthy daily routines to maintain his mood and physical health. We discussed his hobbies and areas of interest as his coping skills to stay busy and feel content with his life. We ended the session with upcoming events/activities in July. Channing was happy and excited about his younger sister, who graduated from high school in [place], and her visit to [place] in July. We will continue to explore his daily routine and the elements that can help him manage his symptoms.    Plan/Practice Task(s): - Explore social activities to prevent social isolation and nurture support network - Use the problem behavior list to self-assess his own actions, thoughts, and emotions.   Skills Training:  - Self-monitoring skills: Noticing limitations and problematic behaviors through self-reflection and reports by others. - "Maintain daily healthy routine" - Envision changes that he wants to attain within 1 year.  Recommended services & referrals made: - [facility]: Pt declined 6/8/2023 - F/U with PCP for annual physical for 2023 & thyroid issue: Pt declined 6/16/2023  Support Network (established contact consent/PHI Release): - Ramiro Storm, father, [phone]   Follow-up: - Return in 1 month

## 2024-06-21 DIAGNOSIS — F20.0 PARANOID SCHIZOPHRENIA: ICD-10-CM

## 2024-07-03 ENCOUNTER — NON-APPOINTMENT (OUTPATIENT)
Age: 29
End: 2024-07-03

## 2024-07-12 ENCOUNTER — NON-APPOINTMENT (OUTPATIENT)
Age: 29
End: 2024-07-12

## 2024-07-18 ENCOUNTER — OUTPATIENT (OUTPATIENT)
Dept: OUTPATIENT SERVICES | Facility: HOSPITAL | Age: 29
LOS: 1 days | End: 2024-07-18
Payer: MEDICARE

## 2024-07-18 ENCOUNTER — APPOINTMENT (OUTPATIENT)
Dept: PSYCHIATRY | Facility: CLINIC | Age: 29
End: 2024-07-18

## 2024-07-18 DIAGNOSIS — F20.0 PARANOID SCHIZOPHRENIA: ICD-10-CM

## 2024-07-18 PROCEDURE — 90832 PSYTX W PT 30 MINUTES: CPT

## 2024-07-19 DIAGNOSIS — F20.0 PARANOID SCHIZOPHRENIA: ICD-10-CM

## 2024-07-25 ENCOUNTER — NON-APPOINTMENT (OUTPATIENT)
Age: 29
End: 2024-07-25

## 2024-07-25 ENCOUNTER — APPOINTMENT (OUTPATIENT)
Dept: PSYCHIATRY | Facility: CLINIC | Age: 29
End: 2024-07-25

## 2024-08-15 ENCOUNTER — APPOINTMENT (OUTPATIENT)
Dept: PSYCHIATRY | Facility: CLINIC | Age: 29
End: 2024-08-15

## 2024-08-20 ENCOUNTER — NON-APPOINTMENT (OUTPATIENT)
Age: 29
End: 2024-08-20

## 2024-08-22 NOTE — PLAN
[FreeTextEntry2] : Goal #1- Medication management\par Obj #1- The patient will attend regularly scheduled IM injections with Nurse Mckinley and will meet regularly with Dr. Rueda \par \par Goal #2- Maintain overall stability \par Obj #1- The patient will attend Bahai services regularly\par Obj #2- The patient will socialize with friends and family \par Obj #3- The patient will maintain daily structure by adhering to his budget that he set forth and will prepare healthy meals. \par  [Psychoeducation] : Psychoeducation  [Skills training (all types)] : Skills training (all types)  [Supportive Therapy] : Supportive Therapy [de-identified] : **Termination session. The patient attended his scheduled session with the writer via [facility]. The first half of the session was spent trouble shooting with the patient as he was having technical difficulties logging in. The patient noted that he continues to do well and he has been enjoying spending time with his father who is visiting from [place]. Patient notes that he ha  been keeping himself busy with running errands with his father. 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 Patient notes that this has been occupying his time on most days. Patient explains that he continues to be more conscientious of his eating habits. The patient denied any anxiety, depression and or psychosis. Patient is adherent to his IM injections. Patient will be transferred to a therapist in his current psychiatrist's group.  [Recommended Frequency of Visits: ____] : Recommended frequency of visits: [unfilled] MEDICATIONS  (PRN):  acetaminophen     Tablet .. 650 milliGRAM(s) Oral every 6 hours PRN Mild Pain (1 - 3), Moderate Pain (4 - 6), Severe Pain (7 - 10)  aluminum hydroxide/magnesium hydroxide/simethicone Suspension 30 milliLiter(s) Oral every 6 hours PRN Dyspepsia  artificial  tears Solution 1 Drop(s) Both EYES two times a day PRN Dry Eyes  haloperidol     Tablet 5 milliGRAM(s) Oral every 6 hours PRN agitation  haloperidol    Injectable 5 milliGRAM(s) IntraMuscular once PRN severe agitation  ibuprofen  Tablet. 400 milliGRAM(s) Oral every 6 hours PRN Moderate Pain (4 - 6), Severe Pain (7 - 10)  LORazepam     Tablet 1 milliGRAM(s) Oral every 6 hours PRN Agitation  LORazepam   Injectable 1 milliGRAM(s) IntraMuscular once PRN severe agitation   MEDICATIONS  (PRN):  acetaminophen     Tablet .. 650 milliGRAM(s) Oral every 6 hours PRN Mild Pain (1 - 3), Moderate Pain (4 - 6), Severe Pain (7 - 10)  aluminum hydroxide/magnesium hydroxide/simethicone Suspension 30 milliLiter(s) Oral every 6 hours PRN Dyspepsia  artificial  tears Solution 1 Drop(s) Both EYES two times a day PRN Dry Eyes  calcium carbonate    500 mG (Tums) Chewable 1 Tablet(s) Chew every 4 hours PRN Acid reflux  haloperidol     Tablet 5 milliGRAM(s) Oral every 6 hours PRN agitation  haloperidol    Injectable 5 milliGRAM(s) IntraMuscular once PRN severe agitation  ibuprofen  Tablet. 400 milliGRAM(s) Oral every 6 hours PRN Moderate Pain (4 - 6), Severe Pain (7 - 10)  LORazepam     Tablet 1 milliGRAM(s) Oral every 6 hours PRN Agitation  LORazepam   Injectable 1 milliGRAM(s) IntraMuscular once PRN severe agitation

## 2024-08-28 ENCOUNTER — NON-APPOINTMENT (OUTPATIENT)
Age: 29
End: 2024-08-28

## 2024-08-29 ENCOUNTER — APPOINTMENT (OUTPATIENT)
Dept: PSYCHIATRY | Facility: CLINIC | Age: 29
End: 2024-08-29

## 2024-08-29 ENCOUNTER — OUTPATIENT (OUTPATIENT)
Dept: OUTPATIENT SERVICES | Facility: HOSPITAL | Age: 29
LOS: 1 days | End: 2024-08-29
Payer: MEDICARE

## 2024-08-29 VITALS
HEIGHT: 67 IN | HEART RATE: 84 BPM | DIASTOLIC BLOOD PRESSURE: 64 MMHG | SYSTOLIC BLOOD PRESSURE: 112 MMHG | BODY MASS INDEX: 30.45 KG/M2 | RESPIRATION RATE: 16 BRPM | WEIGHT: 194 LBS

## 2024-08-29 DIAGNOSIS — F20.0 PARANOID SCHIZOPHRENIA: ICD-10-CM

## 2024-08-29 PROCEDURE — 96372 THER/PROPH/DIAG INJ SC/IM: CPT

## 2024-08-29 PROCEDURE — ZZZZZ: CPT

## 2024-08-29 PROCEDURE — 99212 OFFICE O/P EST SF 10 MIN: CPT | Mod: 25

## 2024-08-29 RX ADMIN — PALIPERIDONE PALMITATE 0 MG/0.88ML: 273 INJECTION, SUSPENSION, EXTENDED RELEASE INTRAMUSCULAR at 00:00

## 2024-08-29 NOTE — PLAN
[Yes. details: ___] : Yes, [unfilled] [Medication education provided] : Medication education provided. [FreeTextEntry4] : Remain stable and not have any episodes [FreeTextEntry5] : - c/w Invega Trinza 273 mg IM [id number] - Follow up in 3 months for next administration of Invega Trinza 273 mg (Nov 14th 2024 at 3PM) - Continue psychotherapy appointments with clinic therapist - labs ordered, f/u at next visit (CBC, BMP, Thyroid, Lipid, A1c)

## 2024-08-29 NOTE — HISTORY OF PRESENT ILLNESS
[Not Applicable] : Not applicable [FreeTextEntry1] : Patient is a 28 year old male, single, domiciled with father and sister in [place], unemployed on SSD, with PPHx of Schizoaffective Disorder versus Schizophrenia, hx of remote IPP admissions, in outpatient psychiatric treatment at [facility] since 2015, on Invega Trinza FOSTER [id number], who presents for follow-up today. [FreeTextEntry2] : schizoaffective d/o vs schizophrenia [FreeTextEntry3] : invega trinza 273mg IM [id number]

## 2024-08-29 NOTE — DISCUSSION/SUMMARY
[FreeTextEntry1] : Patient is a 28 year old male, single, domiciled with father and sister in [place], unemployed on SSD, with PPHx of Schizoaffective Disorder versus Schizophrenia, hx of remote IPP admissions, in outpatient psychiatric treatment at [facility] since 2015, on Invega Trinza FOSTER [id number], who presents for follow-up today.  He presents as stable with good control of his illness, without any symptoms suspicious for psychosis or mood symptoms. here was no suicidal or homicidal ideation reported. Patients daily functioning appears adequate for basic ADLs at this time, but his low insight into his illness is concerning for increased risk of decompensation, considering a remote history of decompensation when not managed closely with consistent medication and therapy. Currently, patient has father to monitor him in the home. Patient is resistant to be seen monthly-claiming he has high co-pays for each visit but he continues to meet with his therapist monthly. No side effects from Invega Trinza observed or elicited. Ordered health maintenance labs to review at next appointment. No signs of psychiatric decompensation noted today. He remains appropriate for outpatient level of care at this time.

## 2024-08-29 NOTE — PHYSICAL EXAM
[Cooperative] : cooperative [Euthymic] : euthymic [Constricted] : constricted [Clear] : clear [[place]] : concrete [None] : none [None Reported] : none reported [Average] : average [Difficulty acknowledging presence of psychiatric problems] : Difficulty acknowledging presence of psychiatric problems [WNL] : within normal limits [FreeTextEntry1] : wearing mask [FreeTextEntry8] : "Good"

## 2024-08-29 NOTE — HISTORY OF PRESENT ILLNESS
[Not Applicable] : Not applicable [FreeTextEntry1] : Patient is a 28 year old male, single, domiciled with father and sister in [place], unemployed on SSD, with PPHx of Schizoaffective Disorder versus Schizophrenia, hx of remote IPP admissions, in outpatient psychiatric treatment at [facility] since 2015, on Invega Trinza FOSTER [id number], who presents for follow-up today. [FreeTextEntry2] : schizoaffective d/o vs schizophrenia [FreeTextEntry3] : invega trinza 273mg IM [id number]

## 2024-08-29 NOTE — DISCUSSION/SUMMARY
[FreeTextEntry1] : Patient is a 28 year old male, single, domiciled with father and sister in [place], unemployed on SSD, with PPHx of Schizoaffective Disorder versus Schizophrenia, hx of remote IPP admissions, in outpatient psychiatric treatment at [facility] since 2015, on Invega Trinza FOSTER [id number], who presents for follow-up today.  He presents as stable with good control of his illness, without any symptoms suspicious for psychosis or mood symptoms. here was no suicidal or homicidal ideation reported. Patients daily functioning appears adequate for basic ADLs at this time, but his low insight into his illness is concerning for increased risk of decompensation, considering a remote history of decompensation when not managed closely with consistent medication and therapy. Currently, patient has father to monitor him in the home. Patient is resistant to be seen monthly-claiming he has high co-pays for each visit but he continues to meet with his therapist monthly. No side effects from Invega Trinza observed or elicited. Ordered health maintenance labs to review at next appointment. No signs of psychiatric decompensation noted today. He remains appropriate for outpatient level of care at this time.

## 2024-08-29 NOTE — PLAN
[Yes. details: ___] : Yes, [unfilled] [Medication education provided] : Medication education provided. [FreeTextEntry4] : Remain stable and not have any episodes [FreeTextEntry5] : - c/w Invega Trinza 273 mg IM [id number] - Follow up in 3 months for next administration of Invega Trinza 273 mg (Nov 14th 2024 at 3PM) - Continue psychotherapy appointments with clinic therapist - labs ordered, f/u at next visit (CBC, BMP, Thyroid, Lipid, A1c)

## 2024-08-29 NOTE — PHYSICAL EXAM
[Cooperative] : cooperative [Euthymic] : euthymic [Constricted] : constricted [Clear] : clear [[place]] : concrete [None] : none [None Reported] : none reported [Average] : average [Difficulty acknowledging presence of psychiatric problems] : Difficulty acknowledging presence of psychiatric problems [WNL] : within normal limits [FreeTextEntry1] : wearing mask [FreeTextEntry8] : "Good"

## 2024-08-29 NOTE — PHYSICAL EXAM
[Cooperative] : cooperative [Euthymic] : euthymic [Constricted] : constricted [Clear] : clear [[place]] : concrete [None] : none [None Reported] : none reported [Average] : average [Difficulty acknowledging presence of psychiatric problems] : Difficulty acknowledging presence of psychiatric problems [WNL] : within normal limits [FreeTextEntry1] : wearing mask [FreeTextEntry8] : "Good"

## 2024-08-29 NOTE — DISCUSSION/SUMMARY
[FreeTextEntry1] : Patient is a 28 year old male, single, domiciled with father and sister in [place], unemployed on SSD, with PPHx of Schizoaffective Disorder versus Schizophrenia, hx of remote IPP admissions, in outpatient psychiatric treatment at [facility] since 2015, on Invega Trinza FOSTER [id number], who presents for follow-up today.  He presents as stable with good control of his illness, without any symptoms suspicious for psychosis or mood symptoms. here was no suicidal or homicidal ideation reported. Patients daily functioning appears adequate for basic ADLs at this time, but his low insight into his illness is concerning for increased risk of decompensation, considering a remote history of decompensation when not managed closely with consistent medication and therapy. Currently, patient has father to monitor him in the home. Patient is resistant to be seen monthly-claiming he has high co-pays for each visit but he continues to meet with his therapist monthly. No side effects from Invega Trinza observed or elicited. Ordered health maintenance labs to review at next appointment. No signs of psychiatric decompensation noted today. He remains appropriate for outpatient level of care at this time.

## 2024-08-29 NOTE — HISTORY OF PRESENT ILLNESS
[Not Applicable] : Not applicable [FreeTextEntry1] : Patient is a 28 year old male, single, domiciled with father and sister in [place], unemployed on SSD, with PPHx of Schizoaffective Disorder versus Schizophrenia, hx of remote IPP admissions, in outpatient psychiatric treatment at [facility] since 2015, on Invega Trinza FOSTER [id number], who presents for follow-up today. [FreeTextEntry2] : schizoaffective d/o vs schizophrenia [FreeTextEntry3] : invega trinza 273mg IM [id number]

## 2024-08-29 NOTE — TREATMENT
[No Side Effects] : No side effects [FreeTextEntry4] : Channing was seen today for Invega Trinza 273 mg IM in the left gluteal muscle. Area cleansed prior to and without signs or symptoms of adverse reaction after administration. Medication education reviewed with Channing he denies any side effects or concerns at this time. Channing was pleasant and cooperative. He also had an appointment with Dr. Turner please refer to that note for a mental status. He will return to this clinic on 11/14 to meet with Dr. Turner and this writer for injection.

## 2024-08-29 NOTE — PLAN
[Yes. details: ___] : Yes, [unfilled] [Medication education provided] : Medication education provided. [FreeTextEntry4] : Remain stable and not have any episodes [FreeTextEntry5] : - c/w Invega Trinza 273 mg IM [id number] - Follow up in 3 months for next administration of Invega Trinza 273 mg (Nov 14th 2024 at 3PM) - Continue psychotherapy appointments with clinic therapist - labs ordered, f/u at next visit (CBC, BMP, Thyroid, Lipid, A1c)

## 2024-08-30 DIAGNOSIS — F20.0 PARANOID SCHIZOPHRENIA: ICD-10-CM

## 2024-09-05 ENCOUNTER — APPOINTMENT (OUTPATIENT)
Dept: PSYCHIATRY | Facility: CLINIC | Age: 29
End: 2024-09-05

## 2024-09-05 ENCOUNTER — OUTPATIENT (OUTPATIENT)
Dept: OUTPATIENT SERVICES | Facility: HOSPITAL | Age: 29
LOS: 1 days | End: 2024-09-05
Payer: MEDICARE

## 2024-09-05 DIAGNOSIS — F20.0 PARANOID SCHIZOPHRENIA: ICD-10-CM

## 2024-09-05 PROCEDURE — 90832 PSYTX W PT 30 MINUTES: CPT | Mod: 95

## 2024-09-05 NOTE — REASON FOR VISIT
[Patient preference] : as per patient preference [Telehealth (audio & video) - Individual/Group] : This visit was provided via telehealth using real-time 2-way audio visual technology. [Other Location: e.g. Home (Enter Location, City,State)___] : The provider was located at [unfilled]. [Home] : The patient, [unfilled], was located at home, [unfilled], at the time of the visit. [Verbal consent obtained from patient/other participant(s)] : Verbal consent for telehealth/telephonic services obtained from patient/other participant(s) [Patient] : Patient [FreeTextEntry4] : 3:30 PM [FreeTextEntry5] : 3:56 PM [FreeTextEntry1] : Psychotherapy follow-up visit with the treatment diagnoses of Schizophrenia, paranoid type (295.30) (F20.0).\par  \par

## 2024-09-05 NOTE — REASON FOR VISIT
no... [Patient preference] : as per patient preference [Telehealth (audio & video) - Individual/Group] : This visit was provided via telehealth using real-time 2-way audio visual technology. [Other Location: e.g. Home (Enter Location, City,State)___] : The provider was located at [unfilled]. [Home] : The patient, [unfilled], was located at home, [unfilled], at the time of the visit. [Verbal consent obtained from patient/other participant(s)] : Verbal consent for telehealth/telephonic services obtained from patient/other participant(s) [Patient] : Patient [FreeTextEntry4] : 3:30 PM [FreeTextEntry5] : 3:56 PM [FreeTextEntry1] : Psychotherapy follow-up visit with the treatment diagnoses of Schizophrenia, paranoid type (295.30) (F20.0).\par  \par

## 2024-09-05 NOTE — PLAN
[Acceptance and Commitment Therapy] : Acceptance and Commitment Therapy  [[facility] [facility]] : [facility]  [Motivational Interviewing] : Motivational Interviewing  [Psychoeducation] : Psychoeducation  [Supportive Therapy] : Supportive Therapy [FreeTextEntry2] : Treatment Goal (effective as of 9/5/24) - Objective A. Pt will discuss his recent experiences of x1 -3 self-care activities and strategies in monthly therapy sessions to identify their benefits and outcomes, including their impact on his mood and mental health symptom management, to increase self-awareness and insights. - Objective B. Pt will monitor symptoms and maintain medication management by attending injection treatment and meeting with the psychiatrist every three months.  Recommended Frequency of Visits - Medication Management: every 3 months - Individual Therapy: every 1 months.  [de-identified] : Channing confirmed attending injection and psychiatrist follow-up every three months with no imminent concerns.The patient reported a fair mood and is putting effort into maintaining healthy daily routines to maintain his mood and physical health. We completed the treatment plan review during today's session.   BH Plan/Practice Task(s): - Explore social activities to prevent social isolation and nurture support network - Use the problem behavior list to self-assess his own actions, thoughts, and emotions.   Skills Training:  - Self-monitoring skills: Noticing limitations and problematic behaviors through self-reflection and reports by others. - "Maintain daily healthy routine" - Envision changes that he wants to attain within 1 year.  Recommended services & referrals made: - [facility]: Pt declined 6/8/2023 - F/U with PCP for annual physical for 2023 & thyroid issue: Pt declined 6/16/2023  Support Network (established contact consent/PHI Release): - Ramiro Storm, father, [phone]   Follow-up: - Return in 1 month

## 2024-09-05 NOTE — PLAN
[Acceptance and Commitment Therapy] : Acceptance and Commitment Therapy  [[place] [facility]] : [facility]  [Motivational Interviewing] : Motivational Interviewing  [Psychoeducation] : Psychoeducation  [Supportive Therapy] : Supportive Therapy [FreeTextEntry2] : Treatment Goal (effective as of 9/5/24) - Objective A. Pt will discuss his recent experiences of x1 -3 self-care activities and strategies in monthly therapy sessions to identify their benefits and outcomes, including their impact on his mood and mental health symptom management, to increase self-awareness and insights. - Objective B. Pt will monitor symptoms and maintain medication management by attending injection treatment and meeting with the psychiatrist every three months.  Recommended Frequency of Visits - Medication Management: every 3 months - Individual Therapy: every 1 months.  [de-identified] : Channing confirmed attending injection and psychiatrist follow-up every three months with no imminent concerns.The patient reported a fair mood and is putting effort into maintaining healthy daily routines to maintain his mood and physical health. We completed the treatment plan review during today's session.   BH Plan/Practice Task(s): - Explore social activities to prevent social isolation and nurture support network - Use the problem behavior list to self-assess his own actions, thoughts, and emotions.   Skills Training:  - Self-monitoring skills: Noticing limitations and problematic behaviors through self-reflection and reports by others. - "Maintain daily healthy routine" - Envision changes that he wants to attain within 1 year.  Recommended services & referrals made: - [facility]: Pt declined 6/8/2023 - F/U with PCP for annual physical for 2023 & thyroid issue: Pt declined 6/16/2023  Support Network (established contact consent/PHI Release): - Ramiro Storm, father, [phone]   Follow-up: - Return in 1 month

## 2024-09-05 NOTE — PLAN
[Acceptance and Commitment Therapy] : Acceptance and Commitment Therapy  [[facility] [facility]] : [facility]  [Motivational Interviewing] : Motivational Interviewing  [Psychoeducation] : Psychoeducation  [Supportive Therapy] : Supportive Therapy [FreeTextEntry2] : Treatment Goal (effective as of 9/5/24) - Objective A. Pt will discuss his recent experiences of x1 -3 self-care activities and strategies in monthly therapy sessions to identify their benefits and outcomes, including their impact on his mood and mental health symptom management, to increase self-awareness and insights. - Objective B. Pt will monitor symptoms and maintain medication management by attending injection treatment and meeting with the psychiatrist every three months.  Recommended Frequency of Visits - Medication Management: every 3 months - Individual Therapy: every 1 months.  [de-identified] : Channing confirmed attending injection and psychiatrist follow-up every three months with no imminent concerns.The patient reported a fair mood and is putting effort into maintaining healthy daily routines to maintain his mood and physical health. We completed the treatment plan review during today's session.   BH Plan/Practice Task(s): - Explore social activities to prevent social isolation and nurture support network - Use the problem behavior list to self-assess his own actions, thoughts, and emotions.   Skills Training:  - Self-monitoring skills: Noticing limitations and problematic behaviors through self-reflection and reports by others. - "Maintain daily healthy routine" - Envision changes that he wants to attain within 1 year.  Recommended services & referrals made: - [facility]: Pt declined 6/8/2023 - F/U with PCP for annual physical for 2023 & thyroid issue: Pt declined 6/16/2023  Support Network (established contact consent/PHI Release): - Ramiro Storm, father, [phone]   Follow-up: - Return in 1 month

## 2024-09-05 NOTE — PLAN
[Acceptance and Commitment Therapy] : Acceptance and Commitment Therapy  [[place] [facility]] : [facility]  [Motivational Interviewing] : Motivational Interviewing  [Psychoeducation] : Psychoeducation  [Supportive Therapy] : Supportive Therapy [FreeTextEntry2] : Treatment Goal (effective as of 9/5/24) - Objective A. Pt will discuss his recent experiences of x1 -3 self-care activities and strategies in monthly therapy sessions to identify their benefits and outcomes, including their impact on his mood and mental health symptom management, to increase self-awareness and insights. - Objective B. Pt will monitor symptoms and maintain medication management by attending injection treatment and meeting with the psychiatrist every three months.  Recommended Frequency of Visits - Medication Management: every 3 months - Individual Therapy: every 1 months.  [de-identified] : Channing confirmed attending injection and psychiatrist follow-up every three months with no imminent concerns.The patient reported a fair mood and is putting effort into maintaining healthy daily routines to maintain his mood and physical health. We completed the treatment plan review during today's session.   BH Plan/Practice Task(s): - Explore social activities to prevent social isolation and nurture support network - Use the problem behavior list to self-assess his own actions, thoughts, and emotions.   Skills Training:  - Self-monitoring skills: Noticing limitations and problematic behaviors through self-reflection and reports by others. - "Maintain daily healthy routine" - Envision changes that he wants to attain within 1 year.  Recommended services & referrals made: - [facility]: Pt declined 6/8/2023 - F/U with PCP for annual physical for 2023 & thyroid issue: Pt declined 6/16/2023  Support Network (established contact consent/PHI Release): - Ramiro Storm, father, [phone]   Follow-up: - Return in 1 month

## 2024-09-06 DIAGNOSIS — F20.0 PARANOID SCHIZOPHRENIA: ICD-10-CM

## 2024-09-08 NOTE — RISK ASSESSMENT
[FreeTextEntry9] : Low acute suicide risk [No, patient denies ideation or behavior] : No, patient denies ideation or behavior [Low acute suicide risk] : Low acute suicide risk [No] : No

## 2024-09-08 NOTE — DISCUSSION/SUMMARY
[Plan Revision] : Plan Revision [Motivated to participate in treatment] : motivated to participate in treatment [Motivated to maintain or improve physical health] : motivated to maintain or improve physical health [Part of a supportive family] : part of a supportive family [Housing stability] : housing stability [English fluency] : English fluency [Access to safe outdoor spaces] : access to safe outdoor spaces [Mental Health] : Mental Health [Physical Health] : Physical Health [Social/Leisure] : Social/Leisure [Revised - Rationale] : Revised - Rationale: [Initial] : Initial [Improvement in symptoms as evidenced by:] : Improvement in symptoms as evidenced by: [A change in level of care is needed as evidenced by:] : A change in level of care is needed as evidenced by: [Other rationale for transition/discharge:] : Other rationale for transition/discharge: [None - Reason others did not participate:] : None - Reason others did not participate:  [Yes] : Yes [Psychiatric Provider/Prescriber] : Psychiatric Provider/Prescriber [Therapist] : Therapist [Supervisor (if needed)] : Supervisor [Discontinued - Reason] : Discontinued - Reason: [Continued - Progress made] : Continued - Progress made: [every ___ months] : every [unfilled] months [FreeTextEntry1] : 9/5/2024 [FreeTextEntry2] : 9/5/2025 [FreeTextEntry3] : 12/6/2011 [de-identified] : Channing reported doing well overall and maintaining symptom management. He denied worsening symptoms and acute concerns about his current daily functioning. We decided to discuss his current strategies and explore their impact on his mental and physical health.  [de-identified] : Pt will discuss his recent experiences of x1 -3 self-care activities and strategies in monthly therapy sessions to identify their benefits and outcomes, including their impact on his mood and mental health symptom management, to increase self-awareness and insights. [de-identified] : 9/5/2025 [de-identified] : Channing reported doing well overall and maintaining symptom management. He denied worsening symptoms and acute concerns about his current daily functioning. We decided to discuss his current strategies and explore their impact on his mental and physical health.  [FreeTextEntry4] : (Continued Problem/Need I) [de-identified] : Pt will monitor symptoms and maintain medication management by attending injection treatment and meeting with the psychiatrist every three months. [de-identified] : Channing reported doing well overall and maintaining symptom management. He denied worsening symptoms and acute concerns about his current daily functioning.  [de-identified] : 9/5/2025 [FreeTextEntry5] : Acceptance and Commitment Therapy, [place] [facility], Motivational Interviewing, Psychoeducation, and Supportive Therapy [de-identified] : The patient expresses improvement in performing activities of daily living and/or says progress with initial complaint symptoms. In addition, the treatment team will conduct diagnose-based screenings as needed.  [de-identified] : If the patient is unable to perform activities of daily living and/or expresses suicidal ideation, a higher level of care will be considered. [de-identified] : Other rationale(s) for transition/discharge is/are granted/applied upon the patient DNC, relocation, self-termination, and/or requests for the treatment termination/transfer to another facility. [de-identified] : Pt declined.  [de-identified] : Manuel Chao ("Claribel"), Tena Dela Cruz

## 2024-09-08 NOTE — DISCUSSION/SUMMARY
[Plan Revision] : Plan Revision [Motivated to participate in treatment] : motivated to participate in treatment [Motivated to maintain or improve physical health] : motivated to maintain or improve physical health [Part of a supportive family] : part of a supportive family [Housing stability] : housing stability [English fluency] : English fluency [Access to safe outdoor spaces] : access to safe outdoor spaces [Mental Health] : Mental Health [Physical Health] : Physical Health [Social/Leisure] : Social/Leisure [Revised - Rationale] : Revised - Rationale: [Initial] : Initial [Improvement in symptoms as evidenced by:] : Improvement in symptoms as evidenced by: [A change in level of care is needed as evidenced by:] : A change in level of care is needed as evidenced by: [Other rationale for transition/discharge:] : Other rationale for transition/discharge: [None - Reason others did not participate:] : None - Reason others did not participate:  [Yes] : Yes [Psychiatric Provider/Prescriber] : Psychiatric Provider/Prescriber [Therapist] : Therapist [Supervisor (if needed)] : Supervisor [Discontinued - Reason] : Discontinued - Reason: [Continued - Progress made] : Continued - Progress made: [every ___ months] : every [unfilled] months [FreeTextEntry1] : 9/5/2024 [FreeTextEntry2] : 9/5/2025 [FreeTextEntry3] : 12/6/2011 [de-identified] : Channing reported doing well overall and maintaining symptom management. He denied worsening symptoms and acute concerns about his current daily functioning. We decided to discuss his current strategies and explore their impact on his mental and physical health.  [de-identified] : Pt will discuss his recent experiences of x1 -3 self-care activities and strategies in monthly therapy sessions to identify their benefits and outcomes, including their impact on his mood and mental health symptom management, to increase self-awareness and insights. [de-identified] : 9/5/2025 [de-identified] : Channing reported doing well overall and maintaining symptom management. He denied worsening symptoms and acute concerns about his current daily functioning. We decided to discuss his current strategies and explore their impact on his mental and physical health.  [FreeTextEntry4] : (Continued Problem/Need I) [de-identified] : Pt will monitor symptoms and maintain medication management by attending injection treatment and meeting with the psychiatrist every three months. [de-identified] : Channing reported doing well overall and maintaining symptom management. He denied worsening symptoms and acute concerns about his current daily functioning.  [de-identified] : 9/5/2025 [FreeTextEntry5] : Acceptance and Commitment Therapy, [place] [facility], Motivational Interviewing, Psychoeducation, and Supportive Therapy [de-identified] : The patient expresses improvement in performing activities of daily living and/or says progress with initial complaint symptoms. In addition, the treatment team will conduct diagnose-based screenings as needed.  [de-identified] : If the patient is unable to perform activities of daily living and/or expresses suicidal ideation, a higher level of care will be considered. [de-identified] : Other rationale(s) for transition/discharge is/are granted/applied upon the patient DNC, relocation, self-termination, and/or requests for the treatment termination/transfer to another facility. [de-identified] : Pt declined.  [de-identified] : Manuel Chao ("Claribel"), Tena Dela Cruz

## 2024-09-08 NOTE — ADDENDUM
[FreeTextEntry1] : *The treatment plan review was postponed to today due to a scheduling conflict.   The patient's preference for treatment visit types: [ ] In-person  [ x] Telehealth [ ] Hybrid  PRIMARY CARE  Dr. Eligio Barney MD [address] [place] [id number] [phone] The Last PCP Visit: 2022 The next scheduled annual check up is 9/17/2024  MEDICAL CONDITION(S) - Pt denied.  SUBSTANCE USE - Pt denied. - The patient denied any concerns or problem with substance use. - The patient denied substance-related hx of hospitalization, detox/rehab, or treatment.   SMOKING CESSATION Do you Smoke? No.

## 2024-09-08 NOTE — DISCUSSION/SUMMARY
[Plan Revision] : Plan Revision [Motivated to participate in treatment] : motivated to participate in treatment [Motivated to maintain or improve physical health] : motivated to maintain or improve physical health [Part of a supportive family] : part of a supportive family [Housing stability] : housing stability [English fluency] : English fluency [Access to safe outdoor spaces] : access to safe outdoor spaces [Mental Health] : Mental Health [Physical Health] : Physical Health [Social/Leisure] : Social/Leisure [Revised - Rationale] : Revised - Rationale: [Initial] : Initial [Improvement in symptoms as evidenced by:] : Improvement in symptoms as evidenced by: [A change in level of care is needed as evidenced by:] : A change in level of care is needed as evidenced by: [Other rationale for transition/discharge:] : Other rationale for transition/discharge: [None - Reason others did not participate:] : None - Reason others did not participate:  [Yes] : Yes [Psychiatric Provider/Prescriber] : Psychiatric Provider/Prescriber [Therapist] : Therapist [Supervisor (if needed)] : Supervisor [Discontinued - Reason] : Discontinued - Reason: [Continued - Progress made] : Continued - Progress made: [every ___ months] : every [unfilled] months [FreeTextEntry1] : 9/5/2024 [FreeTextEntry2] : 9/5/2025 [FreeTextEntry3] : 12/6/2011 [de-identified] : Channing reported doing well overall and maintaining symptom management. He denied worsening symptoms and acute concerns about his current daily functioning. We decided to discuss his current strategies and explore their impact on his mental and physical health.  [de-identified] : Pt will discuss his recent experiences of x1 -3 self-care activities and strategies in monthly therapy sessions to identify their benefits and outcomes, including their impact on his mood and mental health symptom management, to increase self-awareness and insights. [de-identified] : 9/5/2025 [de-identified] : Channing reported doing well overall and maintaining symptom management. He denied worsening symptoms and acute concerns about his current daily functioning. We decided to discuss his current strategies and explore their impact on his mental and physical health.  [FreeTextEntry4] : (Continued Problem/Need I) [de-identified] : Pt will monitor symptoms and maintain medication management by attending injection treatment and meeting with the psychiatrist every three months. [de-identified] : Channing reported doing well overall and maintaining symptom management. He denied worsening symptoms and acute concerns about his current daily functioning.  [de-identified] : 9/5/2025 [FreeTextEntry5] : Acceptance and Commitment Therapy, [place] [facility], Motivational Interviewing, Psychoeducation, and Supportive Therapy [de-identified] : The patient expresses improvement in performing activities of daily living and/or says progress with initial complaint symptoms. In addition, the treatment team will conduct diagnose-based screenings as needed.  [de-identified] : If the patient is unable to perform activities of daily living and/or expresses suicidal ideation, a higher level of care will be considered. [de-identified] : Other rationale(s) for transition/discharge is/are granted/applied upon the patient DNC, relocation, self-termination, and/or requests for the treatment termination/transfer to another facility. [de-identified] : Pt declined.  [de-identified] : Manuel Chao ("Claribel"), Tena Dela Cruz

## 2024-09-08 NOTE — ADDENDUM
[FreeTextEntry1] : *The treatment plan review was postponed to today due to a scheduling conflict.   The patient's preference for treatment visit types: [ ] In-person  [ x] Telehealth [ ] Hybrid  PRIMARY CARE  Dr. Eligio Barney MD [address] [place] [id number] [phone] The Last PCP Visit: 2022 The next scheduled annual check up is 9/17/2024  MEDICAL CONDITION(S) - Pt denied.  SUBSTANCE USE - Pt denied. - The patient denied any concerns or problem with substance use. - The patient denied substance-related hx of hospitalization, detox/rehab, or treatment.   SMOKING CESSATION Do you Smoke? No.

## 2024-09-08 NOTE — ADDENDUM
[FreeTextEntry1] : *The treatment plan review was postponed to today due to a scheduling conflict.   The patient's preference for treatment visit types: [ ] In-person  [ x] Telehealth [ ] Hybrid  PRIMARY CARE  Dr. Eligio Barney MD [address] [place] [phone] The Last PCP Visit: 2022 The next scheduled annual check up is 9/17/2024  MEDICAL CONDITION(S) - Pt denied.  SUBSTANCE USE - Pt denied. - The patient denied any concerns or problem with substance use. - The patient denied substance-related hx of hospitalization, detox/rehab, or treatment.   SMOKING CESSATION Do you Smoke? No.

## 2024-09-08 NOTE — DISCUSSION/SUMMARY
[Plan Revision] : Plan Revision [Motivated to participate in treatment] : motivated to participate in treatment [Motivated to maintain or improve physical health] : motivated to maintain or improve physical health [Part of a supportive family] : part of a supportive family [Housing stability] : housing stability [English fluency] : English fluency [Access to safe outdoor spaces] : access to safe outdoor spaces [Mental Health] : Mental Health [Physical Health] : Physical Health [Social/Leisure] : Social/Leisure [Revised - Rationale] : Revised - Rationale: [Initial] : Initial [Improvement in symptoms as evidenced by:] : Improvement in symptoms as evidenced by: [A change in level of care is needed as evidenced by:] : A change in level of care is needed as evidenced by: [Other rationale for transition/discharge:] : Other rationale for transition/discharge: [None - Reason others did not participate:] : None - Reason others did not participate:  [Yes] : Yes [Psychiatric Provider/Prescriber] : Psychiatric Provider/Prescriber [Therapist] : Therapist [Supervisor (if needed)] : Supervisor [Discontinued - Reason] : Discontinued - Reason: [Continued - Progress made] : Continued - Progress made: [every ___ months] : every [unfilled] months [FreeTextEntry1] : 9/5/2024 [FreeTextEntry2] : 9/5/2025 [FreeTextEntry3] : 12/6/2011 [de-identified] : Channing reported doing well overall and maintaining symptom management. He denied worsening symptoms and acute concerns about his current daily functioning. We decided to discuss his current strategies and explore their impact on his mental and physical health.  [de-identified] : Pt will discuss his recent experiences of x1 -3 self-care activities and strategies in monthly therapy sessions to identify their benefits and outcomes, including their impact on his mood and mental health symptom management, to increase self-awareness and insights. [de-identified] : 9/5/2025 [de-identified] : Channing reported doing well overall and maintaining symptom management. He denied worsening symptoms and acute concerns about his current daily functioning. We decided to discuss his current strategies and explore their impact on his mental and physical health.  [FreeTextEntry4] : (Continued Problem/Need I) [de-identified] : Pt will monitor symptoms and maintain medication management by attending injection treatment and meeting with the psychiatrist every three months. [de-identified] : Channing reported doing well overall and maintaining symptom management. He denied worsening symptoms and acute concerns about his current daily functioning.  [de-identified] : 9/5/2025 [FreeTextEntry5] : Acceptance and Commitment Therapy, [place] [facility], Motivational Interviewing, Psychoeducation, and Supportive Therapy [de-identified] : The patient expresses improvement in performing activities of daily living and/or says progress with initial complaint symptoms. In addition, the treatment team will conduct diagnose-based screenings as needed.  [de-identified] : If the patient is unable to perform activities of daily living and/or expresses suicidal ideation, a higher level of care will be considered. [de-identified] : Other rationale(s) for transition/discharge is/are granted/applied upon the patient DNC, relocation, self-termination, and/or requests for the treatment termination/transfer to another facility. [de-identified] : Pt declined.  [de-identified] : Manuel Chao ("Claribel"), Tena Dela Cruz

## 2024-09-08 NOTE — ADDENDUM
[FreeTextEntry1] : *The treatment plan review was postponed to today due to a scheduling conflict.   The patient's preference for treatment visit types: [ ] In-person  [ x] Telehealth [ ] Hybrid  PRIMARY CARE  Dr. Eligio Barney MD [address] [place] [id number] [phone] The Last PCP Visit: 2022 The next scheduled annual check up is 9/17/2024  MEDICAL CONDITION(S) - Pt denied.  SUBSTANCE USE - Pt denied. - The patient denied any concerns or problem with substance use. - The patient denied substance-related hx of hospitalization, detox/rehab, or treatment.   SMOKING CESSATION Do you Smoke? No.

## 2024-09-17 ENCOUNTER — OUTPATIENT (OUTPATIENT)
Dept: OUTPATIENT SERVICES | Facility: HOSPITAL | Age: 29
LOS: 1 days | End: 2024-09-17
Payer: MEDICARE

## 2024-09-17 ENCOUNTER — NON-APPOINTMENT (OUTPATIENT)
Age: 29
End: 2024-09-17

## 2024-09-17 ENCOUNTER — APPOINTMENT (OUTPATIENT)
Dept: INTERNAL MEDICINE | Facility: CLINIC | Age: 29
End: 2024-09-17

## 2024-09-17 VITALS
HEIGHT: 67 IN | OXYGEN SATURATION: 96 % | TEMPERATURE: 98.9 F | HEART RATE: 87 BPM | SYSTOLIC BLOOD PRESSURE: 116 MMHG | BODY MASS INDEX: 32.18 KG/M2 | WEIGHT: 205 LBS | DIASTOLIC BLOOD PRESSURE: 73 MMHG

## 2024-09-17 DIAGNOSIS — E66.9 OBESITY, UNSPECIFIED: ICD-10-CM

## 2024-09-17 DIAGNOSIS — Z00.00 ENCOUNTER FOR GENERAL ADULT MEDICAL EXAMINATION WITHOUT ABNORMAL FINDINGS: ICD-10-CM

## 2024-09-17 DIAGNOSIS — Z00.00 ENCOUNTER FOR GENERAL ADULT MEDICAL EXAMINATION W/OUT ABNORMAL FINDINGS: ICD-10-CM

## 2024-09-17 PROCEDURE — 99395 PREV VISIT EST AGE 18-39: CPT | Mod: GC

## 2024-09-17 PROCEDURE — 99385 PREV VISIT NEW AGE 18-39: CPT

## 2024-09-17 PROCEDURE — 84443 ASSAY THYROID STIM HORMONE: CPT

## 2024-09-17 PROCEDURE — 80053 COMPREHEN METABOLIC PANEL: CPT

## 2024-09-17 PROCEDURE — 85027 COMPLETE CBC AUTOMATED: CPT

## 2024-09-17 PROCEDURE — 86480 TB TEST CELL IMMUN MEASURE: CPT

## 2024-09-17 PROCEDURE — 83036 HEMOGLOBIN GLYCOSYLATED A1C: CPT

## 2024-09-17 PROCEDURE — 80061 LIPID PANEL: CPT

## 2024-09-17 PROCEDURE — 82306 VITAMIN D 25 HYDROXY: CPT

## 2024-09-19 LAB
25(OH)D3 SERPL-MCNC: 26 NG/ML
ALBUMIN SERPL ELPH-MCNC: 4.8 G/DL
ALP BLD-CCNC: 91 U/L
ALT SERPL-CCNC: 34 U/L
ANION GAP SERPL CALC-SCNC: 14 MMOL/L
AST SERPL-CCNC: 15 U/L
BASOPHILS # BLD AUTO: 0.06 K/UL
BASOPHILS NFR BLD AUTO: 0.9 %
BILIRUB SERPL-MCNC: 0.3 MG/DL
BUN SERPL-MCNC: 16 MG/DL
CALCIUM SERPL-MCNC: 10.3 MG/DL
CHLORIDE SERPL-SCNC: 102 MMOL/L
CHOLEST SERPL-MCNC: 256 MG/DL
CO2 SERPL-SCNC: 23 MMOL/L
CREAT SERPL-MCNC: 1.2 MG/DL
EGFR: 83 ML/MIN/1.73M2
EOSINOPHIL # BLD AUTO: 0.19 K/UL
EOSINOPHIL NFR BLD AUTO: 2.8 %
ESTIMATED AVERAGE GLUCOSE: 114 MG/DL
GLUCOSE SERPL-MCNC: 92 MG/DL
HBA1C MFR BLD HPLC: 5.6 %
HCT VFR BLD CALC: 41.2 %
HDLC SERPL-MCNC: 49 MG/DL
HGB BLD-MCNC: 13 G/DL
IMM GRANULOCYTES NFR BLD AUTO: 0.1 %
LDLC SERPL CALC-MCNC: 161 MG/DL
LYMPHOCYTES # BLD AUTO: 2.23 K/UL
LYMPHOCYTES NFR BLD AUTO: 32.5 %
MAN DIFF?: NORMAL
MCHC RBC-ENTMCNC: 26.8 PG
MCHC RBC-ENTMCNC: 31.6 G/DL
MCV RBC AUTO: 84.9 FL
MONOCYTES # BLD AUTO: 0.48 K/UL
MONOCYTES NFR BLD AUTO: 7 %
NEUTROPHILS # BLD AUTO: 3.89 K/UL
NEUTROPHILS NFR BLD AUTO: 56.7 %
NONHDLC SERPL-MCNC: 207 MG/DL
PLATELET # BLD AUTO: 306 K/UL
PMV BLD AUTO: 0 /100 WBCS
POTASSIUM SERPL-SCNC: 4.2 MMOL/L
PROT SERPL-MCNC: 8.1 G/DL
RBC # BLD: 4.85 M/UL
RBC # FLD: 14.3 %
SODIUM SERPL-SCNC: 139 MMOL/L
TRIGL SERPL-MCNC: 228 MG/DL
TSH SERPL-ACNC: 3.11 UIU/ML
WBC # FLD AUTO: 6.86 K/UL

## 2024-09-20 LAB
M TB IFN-G BLD-IMP: NEGATIVE
QUANTIFERON TB PLUS MITOGEN MINUS NIL: 3.22 IU/ML
QUANTIFERON TB PLUS NIL: 0.03 IU/ML
QUANTIFERON TB PLUS TB1 MINUS NIL: 0 IU/ML
QUANTIFERON TB PLUS TB2 MINUS NIL: 0 IU/ML

## 2024-09-27 NOTE — ASSESSMENT
[FreeTextEntry1] : 26M w/ h/o schizophrenia presenting to [facility] care.   #Schizophrenia - currently on Invega FOSTER 273 injection every 3 months - Follows w/ Dr. Turner (scheduled to f/u on 11/14) - Patient refused EKG  #Increased BMI - counselled on lifestyle changes, diet and exercise.  #Healthcare Management - HCV: none recorded; will send screen w/ routine labs - HIV: none recorded; will send screen w/ routine labs - Influenza: Patient refused flu vaccine today - Labwork: CBC, CMP, A1c, TSH, Lipid Panel - RTC in 6 months or PRN

## 2024-09-27 NOTE — HISTORY OF PRESENT ILLNESS
[FreeTextEntry1] : Follow up. Annual Physical. [de-identified] : 26M w/ h/o schizophrenia, obesity, Hx of alcohol abuse presenting for his annual physical visit. Schizophrenia currently well controlled via FOSTER. Overall feeling well today. No acute complaints reported.

## 2024-09-27 NOTE — ASSESSMENT
[FreeTextEntry1] : 26M w/ h/o schizophrenia presenting to [facility] care.   #Schizophrenia - currently on Invega FOSTER 273 injection every 3 months - Follows w/ Dr. Turner (scheduled to f/u on 11/14) - Patient refused EKG  #Increased BMI - counselled on lifestyle changes, diet and exercise.  #Healthcare Management - HCV: none recorded; will send screen w/ routine labs - HIV: none recorded; will send screen w/ routine labs - Influenza: Patient refused flu vaccine today - Labwork: CBC, CMP, A1c, TSH, Lipid Panel - RTC in 6 months or PRN

## 2024-09-27 NOTE — HISTORY OF PRESENT ILLNESS
[FreeTextEntry1] : Follow up. Annual Physical. [de-identified] : 26M w/ h/o schizophrenia, obesity, Hx of alcohol abuse presenting for his annual physical visit. Schizophrenia currently well controlled via FOSTER. Overall feeling well today. No acute complaints reported.

## 2024-10-01 DIAGNOSIS — E66.9 OBESITY, UNSPECIFIED: ICD-10-CM

## 2024-10-01 DIAGNOSIS — Z00.00 ENCOUNTER FOR GENERAL ADULT MEDICAL EXAMINATION WITHOUT ABNORMAL FINDINGS: ICD-10-CM

## 2024-10-11 ENCOUNTER — APPOINTMENT (OUTPATIENT)
Dept: PSYCHIATRY | Facility: CLINIC | Age: 29
End: 2024-10-11

## 2024-10-11 ENCOUNTER — OUTPATIENT (OUTPATIENT)
Dept: OUTPATIENT SERVICES | Facility: HOSPITAL | Age: 29
LOS: 1 days | End: 2024-10-11
Payer: MEDICARE

## 2024-10-11 DIAGNOSIS — F20.2 CATATONIC SCHIZOPHRENIA: ICD-10-CM

## 2024-10-11 PROCEDURE — 90832 PSYTX W PT 30 MINUTES: CPT | Mod: 95

## 2024-10-12 DIAGNOSIS — F20.2 CATATONIC SCHIZOPHRENIA: ICD-10-CM

## 2024-11-12 ENCOUNTER — OUTPATIENT (OUTPATIENT)
Dept: OUTPATIENT SERVICES | Facility: HOSPITAL | Age: 29
LOS: 1 days | End: 2024-11-12
Payer: MEDICARE

## 2024-11-12 ENCOUNTER — APPOINTMENT (OUTPATIENT)
Dept: PSYCHIATRY | Facility: CLINIC | Age: 29
End: 2024-11-12

## 2024-11-12 DIAGNOSIS — F20.0 PARANOID SCHIZOPHRENIA: ICD-10-CM

## 2024-11-12 PROCEDURE — 90832 PSYTX W PT 30 MINUTES: CPT

## 2024-11-13 ENCOUNTER — NON-APPOINTMENT (OUTPATIENT)
Age: 29
End: 2024-11-13

## 2024-11-14 ENCOUNTER — APPOINTMENT (OUTPATIENT)
Dept: PSYCHIATRY | Facility: CLINIC | Age: 29
End: 2024-11-14
Payer: MEDICARE

## 2024-11-14 ENCOUNTER — OUTPATIENT (OUTPATIENT)
Dept: OUTPATIENT SERVICES | Facility: HOSPITAL | Age: 29
LOS: 1 days | End: 2024-11-14
Payer: MEDICARE

## 2024-11-14 ENCOUNTER — NON-APPOINTMENT (OUTPATIENT)
Age: 29
End: 2024-11-14

## 2024-11-14 ENCOUNTER — APPOINTMENT (OUTPATIENT)
Dept: PSYCHIATRY | Facility: CLINIC | Age: 29
End: 2024-11-14

## 2024-11-14 ENCOUNTER — OUTPATIENT (OUTPATIENT)
Dept: OUTPATIENT SERVICES | Facility: HOSPITAL | Age: 29
LOS: 1 days | End: 2024-11-14

## 2024-11-14 DIAGNOSIS — F20.0 PARANOID SCHIZOPHRENIA: ICD-10-CM

## 2024-11-14 PROCEDURE — 99213 OFFICE O/P EST LOW 20 MIN: CPT | Mod: 25

## 2024-11-14 PROCEDURE — ZZZZZ: CPT

## 2024-11-15 ENCOUNTER — TRANSCRIPTION ENCOUNTER (OUTPATIENT)
Age: 29
End: 2024-11-15

## 2024-11-15 DIAGNOSIS — F20.0 PARANOID SCHIZOPHRENIA: ICD-10-CM

## 2024-11-19 RX ORDER — PALIPERIDONE PALMITATE 273 MG/.875ML
273 INJECTION, SUSPENSION, EXTENDED RELEASE INTRAMUSCULAR
Qty: 1 | Refills: 1 | Status: ACTIVE | COMMUNITY
Start: 1900-01-01 | End: 1900-01-01

## 2024-11-20 ENCOUNTER — APPOINTMENT (OUTPATIENT)
Dept: PSYCHIATRY | Facility: CLINIC | Age: 29
End: 2024-11-20

## 2024-11-20 ENCOUNTER — OUTPATIENT (OUTPATIENT)
Dept: OUTPATIENT SERVICES | Facility: HOSPITAL | Age: 29
LOS: 1 days | End: 2024-11-20

## 2024-11-20 VITALS
HEART RATE: 80 BPM | BODY MASS INDEX: 31.08 KG/M2 | TEMPERATURE: 98.6 F | RESPIRATION RATE: 16 BRPM | WEIGHT: 198 LBS | SYSTOLIC BLOOD PRESSURE: 118 MMHG | DIASTOLIC BLOOD PRESSURE: 70 MMHG | HEIGHT: 67 IN

## 2024-11-20 DIAGNOSIS — F20.0 PARANOID SCHIZOPHRENIA: ICD-10-CM

## 2024-11-20 RX ORDER — PALIPERIDONE PALMITATE 546 MG/1.75ML
546 INJECTION, SUSPENSION, EXTENDED RELEASE INTRAMUSCULAR
Qty: 0 | Refills: 0 | Status: COMPLETED | OUTPATIENT
Start: 2024-11-19

## 2024-11-21 DIAGNOSIS — F20.0 PARANOID SCHIZOPHRENIA: ICD-10-CM

## 2024-12-10 NOTE — REASON FOR VISIT
[Patient preference] : as per patient preference [Starting, patient seen in-person within last 6 months] : Telehealth services are being started as patient has seen in-person within last 6 months. [Telehealth (audio & video) - Individual/Group] : This visit was provided via telehealth using real-time 2-way audio visual technology. [Other Location: e.g. Home (Enter Location, City,State)___] : The provider was located at [unfilled]. [Home] : The patient, [unfilled], was located at home, [unfilled], at the time of the visit. [Verbal consent obtained from patient/other participant(s)] : Verbal consent for telehealth/telephonic services obtained from patient/other participant(s) [FreeTextEntry4] : 3:16pm  [FreeTextEntry5] : 3:31pm  [FreeTextEntry2] : 3/28/2023 [Patient] : Patient [FreeTextEntry1] : psychotherapy follow up  64

## 2024-12-12 ENCOUNTER — APPOINTMENT (OUTPATIENT)
Dept: PSYCHIATRY | Facility: CLINIC | Age: 29
End: 2024-12-12

## 2024-12-12 ENCOUNTER — OUTPATIENT (OUTPATIENT)
Dept: OUTPATIENT SERVICES | Facility: HOSPITAL | Age: 29
LOS: 1 days | End: 2024-12-12
Payer: MEDICARE

## 2024-12-12 PROCEDURE — 90834 PSYTX W PT 45 MINUTES: CPT | Mod: 93

## 2024-12-13 DIAGNOSIS — F20.0 PARANOID SCHIZOPHRENIA: ICD-10-CM

## 2025-01-24 ENCOUNTER — APPOINTMENT (OUTPATIENT)
Dept: PSYCHIATRY | Facility: CLINIC | Age: 30
End: 2025-01-24

## 2025-01-24 ENCOUNTER — OUTPATIENT (OUTPATIENT)
Dept: OUTPATIENT SERVICES | Facility: HOSPITAL | Age: 30
LOS: 1 days | End: 2025-01-24
Payer: MEDICARE

## 2025-01-24 DIAGNOSIS — F20.0 PARANOID SCHIZOPHRENIA: ICD-10-CM

## 2025-01-24 PROCEDURE — 90832 PSYTX W PT 30 MINUTES: CPT

## 2025-01-25 DIAGNOSIS — F20.0 PARANOID SCHIZOPHRENIA: ICD-10-CM

## 2025-02-03 ENCOUNTER — APPOINTMENT (OUTPATIENT)
Dept: PSYCHIATRY | Facility: CLINIC | Age: 30
End: 2025-02-03

## 2025-02-03 ENCOUNTER — OUTPATIENT (OUTPATIENT)
Dept: OUTPATIENT SERVICES | Facility: HOSPITAL | Age: 30
LOS: 1 days | End: 2025-02-03
Payer: MEDICARE

## 2025-02-03 DIAGNOSIS — F20.0 PARANOID SCHIZOPHRENIA: ICD-10-CM

## 2025-02-03 PROCEDURE — 90834 PSYTX W PT 45 MINUTES: CPT

## 2025-02-04 DIAGNOSIS — F20.0 PARANOID SCHIZOPHRENIA: ICD-10-CM

## 2025-02-12 ENCOUNTER — NON-APPOINTMENT (OUTPATIENT)
Age: 30
End: 2025-02-12

## 2025-02-13 ENCOUNTER — APPOINTMENT (OUTPATIENT)
Dept: PSYCHIATRY | Facility: CLINIC | Age: 30
End: 2025-02-13
Payer: MEDICARE

## 2025-02-13 ENCOUNTER — OUTPATIENT (OUTPATIENT)
Dept: OUTPATIENT SERVICES | Facility: HOSPITAL | Age: 30
LOS: 1 days | End: 2025-02-13
Payer: MEDICARE

## 2025-02-13 VITALS
HEIGHT: 67 IN | RESPIRATION RATE: 16 BRPM | DIASTOLIC BLOOD PRESSURE: 80 MMHG | HEART RATE: 68 BPM | WEIGHT: 203 LBS | TEMPERATURE: 98.8 F | BODY MASS INDEX: 31.86 KG/M2 | SYSTOLIC BLOOD PRESSURE: 110 MMHG

## 2025-02-13 DIAGNOSIS — F20.0 PARANOID SCHIZOPHRENIA: ICD-10-CM

## 2025-02-13 PROCEDURE — 96372 THER/PROPH/DIAG INJ SC/IM: CPT

## 2025-02-13 PROCEDURE — 99213 OFFICE O/P EST LOW 20 MIN: CPT | Mod: 95

## 2025-02-13 PROCEDURE — 98006 SYNCH AUDIO-VIDEO EST MOD 30: CPT | Mod: 25

## 2025-02-13 RX ORDER — PALIPERIDONE PALMITATE 273 MG/.875ML
273 INJECTION, SUSPENSION, EXTENDED RELEASE INTRAMUSCULAR
Qty: 0 | Refills: 0 | Status: COMPLETED | OUTPATIENT
Start: 2025-02-13

## 2025-02-14 DIAGNOSIS — F20.0 PARANOID SCHIZOPHRENIA: ICD-10-CM

## 2025-03-11 ENCOUNTER — APPOINTMENT (OUTPATIENT)
Dept: PSYCHIATRY | Facility: CLINIC | Age: 30
End: 2025-03-11

## 2025-03-11 ENCOUNTER — OUTPATIENT (OUTPATIENT)
Dept: OUTPATIENT SERVICES | Facility: HOSPITAL | Age: 30
LOS: 1 days | End: 2025-03-11
Payer: MEDICARE

## 2025-03-11 DIAGNOSIS — F20.0 PARANOID SCHIZOPHRENIA: ICD-10-CM

## 2025-03-11 PROCEDURE — 90832 PSYTX W PT 30 MINUTES: CPT | Mod: 95

## 2025-03-12 DIAGNOSIS — F20.0 PARANOID SCHIZOPHRENIA: ICD-10-CM

## 2025-04-01 ENCOUNTER — OUTPATIENT (OUTPATIENT)
Dept: OUTPATIENT SERVICES | Facility: HOSPITAL | Age: 30
LOS: 1 days | End: 2025-04-01

## 2025-04-01 ENCOUNTER — APPOINTMENT (OUTPATIENT)
Dept: PSYCHIATRY | Facility: CLINIC | Age: 30
End: 2025-04-01

## 2025-04-01 DIAGNOSIS — F20.0 PARANOID SCHIZOPHRENIA: ICD-10-CM

## 2025-04-01 PROCEDURE — 90832 PSYTX W PT 30 MINUTES: CPT

## 2025-04-02 DIAGNOSIS — F20.0 PARANOID SCHIZOPHRENIA: ICD-10-CM

## 2025-05-06 ENCOUNTER — APPOINTMENT (OUTPATIENT)
Dept: PSYCHIATRY | Facility: CLINIC | Age: 30
End: 2025-05-06

## 2025-05-06 ENCOUNTER — OUTPATIENT (OUTPATIENT)
Dept: OUTPATIENT SERVICES | Facility: HOSPITAL | Age: 30
LOS: 1 days | End: 2025-05-06
Payer: MEDICARE

## 2025-05-06 DIAGNOSIS — F20.0 PARANOID SCHIZOPHRENIA: ICD-10-CM

## 2025-05-06 PROCEDURE — 90832 PSYTX W PT 30 MINUTES: CPT

## 2025-05-07 ENCOUNTER — NON-APPOINTMENT (OUTPATIENT)
Age: 30
End: 2025-05-07

## 2025-05-07 DIAGNOSIS — F20.0 PARANOID SCHIZOPHRENIA: ICD-10-CM

## 2025-05-08 ENCOUNTER — OUTPATIENT (OUTPATIENT)
Dept: OUTPATIENT SERVICES | Facility: HOSPITAL | Age: 30
LOS: 1 days | End: 2025-05-08
Payer: MEDICARE

## 2025-05-08 ENCOUNTER — APPOINTMENT (OUTPATIENT)
Dept: PSYCHIATRY | Facility: CLINIC | Age: 30
End: 2025-05-08
Payer: MEDICARE

## 2025-05-08 VITALS
RESPIRATION RATE: 18 BRPM | HEIGHT: 67 IN | WEIGHT: 207 LBS | BODY MASS INDEX: 32.49 KG/M2 | SYSTOLIC BLOOD PRESSURE: 116 MMHG | DIASTOLIC BLOOD PRESSURE: 74 MMHG | HEART RATE: 80 BPM

## 2025-05-08 DIAGNOSIS — F20.0 PARANOID SCHIZOPHRENIA: ICD-10-CM

## 2025-05-08 PROCEDURE — 96372 THER/PROPH/DIAG INJ SC/IM: CPT

## 2025-05-08 PROCEDURE — ZZZZZ: CPT

## 2025-05-08 PROCEDURE — 99214 OFFICE O/P EST MOD 30 MIN: CPT | Mod: 25

## 2025-05-08 RX ORDER — PALIPERIDONE PALMITATE 273 MG/.875ML
273 INJECTION, SUSPENSION, EXTENDED RELEASE INTRAMUSCULAR
Qty: 0 | Refills: 0 | Status: COMPLETED | OUTPATIENT
Start: 2025-05-08

## 2025-05-08 RX ADMIN — PALIPERIDONE PALMITATE 0 MG/0.88ML: 273 INJECTION, SUSPENSION, EXTENDED RELEASE INTRAMUSCULAR at 00:00

## 2025-05-09 DIAGNOSIS — F20.0 PARANOID SCHIZOPHRENIA: ICD-10-CM

## 2025-06-03 ENCOUNTER — OUTPATIENT (OUTPATIENT)
Dept: OUTPATIENT SERVICES | Facility: HOSPITAL | Age: 30
LOS: 1 days | End: 2025-06-03
Payer: MEDICARE

## 2025-06-03 ENCOUNTER — APPOINTMENT (OUTPATIENT)
Dept: PSYCHIATRY | Facility: CLINIC | Age: 30
End: 2025-06-03

## 2025-06-03 DIAGNOSIS — F20.0 PARANOID SCHIZOPHRENIA: ICD-10-CM

## 2025-06-03 PROCEDURE — 90832 PSYTX W PT 30 MINUTES: CPT | Mod: 95

## 2025-06-04 DIAGNOSIS — F20.0 PARANOID SCHIZOPHRENIA: ICD-10-CM

## 2025-07-01 ENCOUNTER — APPOINTMENT (OUTPATIENT)
Dept: PSYCHIATRY | Facility: CLINIC | Age: 30
End: 2025-07-01

## 2025-07-01 ENCOUNTER — OUTPATIENT (OUTPATIENT)
Dept: OUTPATIENT SERVICES | Facility: HOSPITAL | Age: 30
LOS: 1 days | End: 2025-07-01
Payer: MEDICARE

## 2025-07-01 PROCEDURE — 90832 PSYTX W PT 30 MINUTES: CPT

## 2025-07-02 DIAGNOSIS — F20.0 PARANOID SCHIZOPHRENIA: ICD-10-CM

## 2025-08-05 ENCOUNTER — OUTPATIENT (OUTPATIENT)
Dept: OUTPATIENT SERVICES | Facility: HOSPITAL | Age: 30
LOS: 1 days | End: 2025-08-05
Payer: MEDICARE

## 2025-08-05 ENCOUNTER — APPOINTMENT (OUTPATIENT)
Dept: PSYCHIATRY | Facility: CLINIC | Age: 30
End: 2025-08-05

## 2025-08-05 DIAGNOSIS — F20.0 PARANOID SCHIZOPHRENIA: ICD-10-CM

## 2025-08-05 PROCEDURE — 90832 PSYTX W PT 30 MINUTES: CPT

## 2025-08-06 ENCOUNTER — NON-APPOINTMENT (OUTPATIENT)
Age: 30
End: 2025-08-06

## 2025-08-06 DIAGNOSIS — F20.0 PARANOID SCHIZOPHRENIA: ICD-10-CM

## 2025-08-07 ENCOUNTER — APPOINTMENT (OUTPATIENT)
Dept: PSYCHIATRY | Facility: CLINIC | Age: 30
End: 2025-08-07
Payer: MEDICARE

## 2025-08-07 ENCOUNTER — OUTPATIENT (OUTPATIENT)
Dept: OUTPATIENT SERVICES | Facility: HOSPITAL | Age: 30
LOS: 1 days | End: 2025-08-07
Payer: MEDICARE

## 2025-08-07 VITALS
SYSTOLIC BLOOD PRESSURE: 110 MMHG | WEIGHT: 200 LBS | HEIGHT: 67 IN | HEART RATE: 68 BPM | DIASTOLIC BLOOD PRESSURE: 70 MMHG | BODY MASS INDEX: 31.39 KG/M2 | RESPIRATION RATE: 18 BRPM

## 2025-08-07 DIAGNOSIS — F20.0 PARANOID SCHIZOPHRENIA: ICD-10-CM

## 2025-08-07 PROCEDURE — 99213 OFFICE O/P EST LOW 20 MIN: CPT | Mod: 25

## 2025-08-07 PROCEDURE — 96372 THER/PROPH/DIAG INJ SC/IM: CPT

## 2025-08-07 PROCEDURE — ZZZZZ: CPT

## 2025-08-07 RX ORDER — PALIPERIDONE PALMITATE 273 MG/.875ML
273 INJECTION, SUSPENSION, EXTENDED RELEASE INTRAMUSCULAR
Qty: 0 | Refills: 0 | Status: COMPLETED | OUTPATIENT
Start: 2025-08-07

## 2025-08-07 RX ADMIN — PALIPERIDONE PALMITATE 0 MG/0.88ML: 273 INJECTION, SUSPENSION, EXTENDED RELEASE INTRAMUSCULAR at 00:00

## 2025-08-08 DIAGNOSIS — F20.0 PARANOID SCHIZOPHRENIA: ICD-10-CM

## 2025-09-05 ENCOUNTER — OUTPATIENT (OUTPATIENT)
Dept: OUTPATIENT SERVICES | Facility: HOSPITAL | Age: 30
LOS: 1 days | End: 2025-09-05
Payer: MEDICARE

## 2025-09-05 ENCOUNTER — APPOINTMENT (OUTPATIENT)
Dept: PSYCHIATRY | Facility: CLINIC | Age: 30
End: 2025-09-05

## 2025-09-05 DIAGNOSIS — F20.0 PARANOID SCHIZOPHRENIA: ICD-10-CM

## 2025-09-05 PROCEDURE — 90832 PSYTX W PT 30 MINUTES: CPT

## 2025-09-06 DIAGNOSIS — F20.0 PARANOID SCHIZOPHRENIA: ICD-10-CM

## 2025-09-12 ENCOUNTER — APPOINTMENT (OUTPATIENT)
Dept: INTERNAL MEDICINE | Facility: CLINIC | Age: 30
End: 2025-09-12

## 2025-09-12 VITALS
WEIGHT: 200 LBS | BODY MASS INDEX: 31.32 KG/M2 | OXYGEN SATURATION: 98 % | DIASTOLIC BLOOD PRESSURE: 72 MMHG | SYSTOLIC BLOOD PRESSURE: 114 MMHG | TEMPERATURE: 98.4 F | HEART RATE: 77 BPM

## 2025-09-12 LAB
ALBUMIN SERPL ELPH-MCNC: 5.1 G/DL
ALP BLD-CCNC: 110 U/L
ALT SERPL-CCNC: 20 U/L
ANION GAP SERPL CALC-SCNC: 13 MMOL/L
AST SERPL-CCNC: 15 U/L
BASOPHILS # BLD AUTO: 0.07 K/UL
BASOPHILS NFR BLD AUTO: 1.1 %
BILIRUB SERPL-MCNC: 0.6 MG/DL
BUN SERPL-MCNC: 14 MG/DL
CALCIUM SERPL-MCNC: 10.2 MG/DL
CHLORIDE SERPL-SCNC: 101 MMOL/L
CHOLEST SERPL-MCNC: 275 MG/DL
CO2 SERPL-SCNC: 23 MMOL/L
CREAT SERPL-MCNC: 1.1 MG/DL
EGFRCR SERPLBLD CKD-EPI 2021: 92 ML/MIN/1.73M2
EOSINOPHIL # BLD AUTO: 0.21 K/UL
EOSINOPHIL NFR BLD AUTO: 3.3 %
ESTIMATED AVERAGE GLUCOSE: 111 MG/DL
GLUCOSE SERPL-MCNC: 95 MG/DL
HBA1C MFR BLD HPLC: 5.5 %
HCT VFR BLD CALC: 41.1 %
HDLC SERPL-MCNC: 50 MG/DL
HGB BLD-MCNC: 13.3 G/DL
IMM GRANULOCYTES NFR BLD AUTO: 0.2 %
LDLC SERPL-MCNC: 189 MG/DL
LYMPHOCYTES # BLD AUTO: 2.62 K/UL
LYMPHOCYTES NFR BLD AUTO: 41.8 %
MAN DIFF?: NORMAL
MCHC RBC-ENTMCNC: 26.9 PG
MCHC RBC-ENTMCNC: 32.4 G/DL
MCV RBC AUTO: 83.2 FL
MONOCYTES # BLD AUTO: 0.48 K/UL
MONOCYTES NFR BLD AUTO: 7.7 %
NEUTROPHILS # BLD AUTO: 2.88 K/UL
NEUTROPHILS NFR BLD AUTO: 45.9 %
NONHDLC SERPL-MCNC: 225 MG/DL
PLATELET # BLD AUTO: 240 K/UL
PMV BLD AUTO: 0 /100 WBCS
PMV BLD: 10.9 FL
POTASSIUM SERPL-SCNC: 4.1 MMOL/L
PROT SERPL-MCNC: 8.3 G/DL
RBC # BLD: 4.94 M/UL
RBC # FLD: 13.7 %
SODIUM SERPL-SCNC: 137 MMOL/L
TRIGL SERPL-MCNC: 193 MG/DL
WBC # FLD AUTO: 6.27 K/UL

## 2025-09-13 LAB
25(OH)D3 SERPL-MCNC: 19 NG/ML
VIT B12 SERPL-MCNC: 963 PG/ML